# Patient Record
Sex: MALE | Race: WHITE | NOT HISPANIC OR LATINO
[De-identification: names, ages, dates, MRNs, and addresses within clinical notes are randomized per-mention and may not be internally consistent; named-entity substitution may affect disease eponyms.]

---

## 2017-03-05 ENCOUNTER — FORM ENCOUNTER (OUTPATIENT)
Age: 57
End: 2017-03-05

## 2017-03-06 ENCOUNTER — APPOINTMENT (OUTPATIENT)
Dept: ULTRASOUND IMAGING | Facility: CLINIC | Age: 57
End: 2017-03-06

## 2017-03-06 ENCOUNTER — OUTPATIENT (OUTPATIENT)
Dept: OUTPATIENT SERVICES | Facility: HOSPITAL | Age: 57
LOS: 1 days | End: 2017-03-06
Payer: COMMERCIAL

## 2017-03-06 DIAGNOSIS — Z00.8 ENCOUNTER FOR OTHER GENERAL EXAMINATION: ICD-10-CM

## 2017-03-06 PROCEDURE — 76882 US LMTD JT/FCL EVL NVASC XTR: CPT

## 2017-06-15 ENCOUNTER — APPOINTMENT (OUTPATIENT)
Dept: ORTHOPEDIC SURGERY | Facility: CLINIC | Age: 57
End: 2017-06-15

## 2017-07-12 ENCOUNTER — OUTPATIENT (OUTPATIENT)
Dept: OUTPATIENT SERVICES | Facility: HOSPITAL | Age: 57
LOS: 1 days | End: 2017-07-12
Payer: COMMERCIAL

## 2017-07-12 ENCOUNTER — APPOINTMENT (OUTPATIENT)
Dept: MRI IMAGING | Facility: HOSPITAL | Age: 57
End: 2017-07-12

## 2017-07-12 PROCEDURE — 73721 MRI JNT OF LWR EXTRE W/O DYE: CPT

## 2017-10-11 ENCOUNTER — OUTPATIENT (OUTPATIENT)
Dept: OUTPATIENT SERVICES | Facility: HOSPITAL | Age: 57
LOS: 1 days | End: 2017-10-11
Payer: COMMERCIAL

## 2017-10-11 ENCOUNTER — APPOINTMENT (OUTPATIENT)
Dept: ULTRASOUND IMAGING | Facility: CLINIC | Age: 57
End: 2017-10-11
Payer: COMMERCIAL

## 2017-10-11 DIAGNOSIS — R74.8 ABNORMAL LEVELS OF OTHER SERUM ENZYMES: ICD-10-CM

## 2017-10-11 PROCEDURE — 76700 US EXAM ABDOM COMPLETE: CPT

## 2017-10-11 PROCEDURE — 76700 US EXAM ABDOM COMPLETE: CPT | Mod: 26

## 2019-03-08 ENCOUNTER — APPOINTMENT (OUTPATIENT)
Dept: PULMONOLOGY | Facility: CLINIC | Age: 59
End: 2019-03-08
Payer: COMMERCIAL

## 2019-03-08 VITALS
SYSTOLIC BLOOD PRESSURE: 145 MMHG | HEART RATE: 68 BPM | TEMPERATURE: 97.7 F | HEIGHT: 69 IN | WEIGHT: 253 LBS | OXYGEN SATURATION: 95 % | BODY MASS INDEX: 37.47 KG/M2 | DIASTOLIC BLOOD PRESSURE: 93 MMHG

## 2019-03-08 LAB — POCT - HEMOGLOBIN (HGB), QUANTITATIVE, TRANSCUTANEOUS: 15.8

## 2019-03-08 PROCEDURE — 94727 GAS DIL/WSHOT DETER LNG VOL: CPT

## 2019-03-08 PROCEDURE — 94060 EVALUATION OF WHEEZING: CPT

## 2019-03-08 PROCEDURE — 94729 DIFFUSING CAPACITY: CPT

## 2019-03-08 PROCEDURE — 99244 OFF/OP CNSLTJ NEW/EST MOD 40: CPT | Mod: 25

## 2019-03-08 PROCEDURE — 88738 HGB QUANT TRANSCUTANEOUS: CPT

## 2019-03-08 RX ORDER — LAMOTRIGINE 25 MG/1
25 TABLET ORAL
Qty: 42 | Refills: 0 | Status: DISCONTINUED | COMMUNITY
Start: 2019-02-06

## 2019-03-08 NOTE — CONSULT LETTER
[FreeTextEntry1] : Dear ,\par \par I had the pleasure of evaluating your patient, ELIZABETH BAEZ today in pulmonary consultation.  Please refer to my attached note for my findings and recommendations.\par \par \par Thank you for allowing me to participate in the care of your patient, please feel free to call with any questions or concerns.\par \par \par Sincerely,\par \par Lori Ac MD\par [facility] \par [facility]\par \par

## 2019-03-08 NOTE — PHYSICAL EXAM
[Well Groomed] : well groomed [General Appearance - In No Acute Distress] : no acute distress [Heart Sounds] : normal S1 and S2 [] : no respiratory distress [Auscultation Breath Sounds / Voice Sounds] : lungs were clear to auscultation bilaterally [Nail Clubbing] : no clubbing of the fingernails [Cyanosis, Localized] : no localized cyanosis [Normal Oropharynx] : normal oropharynx [I] : I

## 2019-03-08 NOTE — HISTORY OF PRESENT ILLNESS
[FreeTextEntry1] : 58M with GIRISH diagnosed 7 years ago, was  severe, ahi 46, was given CPAP, stopped using for years but recently started to use it again at family's urging bc of loud snoring and daytime irritability.\par \par snoring, +witnessed apneas, daytime sleepiness and irritability.  Grinds teeth, uses OTC mouth guard.  ESS 10\par \par Denies issues with sob/cp/cough.  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

## 2019-04-04 ENCOUNTER — APPOINTMENT (OUTPATIENT)
Age: 59
End: 2019-04-04
Payer: COMMERCIAL

## 2019-04-04 PROCEDURE — 95800 SLP STDY UNATTENDED: CPT | Mod: 52

## 2019-04-05 PROCEDURE — 95800 SLP STDY UNATTENDED: CPT

## 2019-04-07 PROCEDURE — 95800 SLP STDY UNATTENDED: CPT | Mod: 52

## 2019-04-08 ENCOUNTER — FORM ENCOUNTER (OUTPATIENT)
Age: 59
End: 2019-04-08

## 2019-04-22 ENCOUNTER — APPOINTMENT (OUTPATIENT)
Dept: PULMONOLOGY | Facility: CLINIC | Age: 59
End: 2019-04-22

## 2019-04-23 ENCOUNTER — APPOINTMENT (OUTPATIENT)
Dept: PULMONOLOGY | Facility: CLINIC | Age: 59
End: 2019-04-23

## 2019-04-25 ENCOUNTER — APPOINTMENT (OUTPATIENT)
Dept: PULMONOLOGY | Facility: CLINIC | Age: 59
End: 2019-04-25
Payer: COMMERCIAL

## 2019-04-25 VITALS — OXYGEN SATURATION: 97 % | DIASTOLIC BLOOD PRESSURE: 97 MMHG | SYSTOLIC BLOOD PRESSURE: 154 MMHG | HEART RATE: 66 BPM

## 2019-04-25 PROCEDURE — 99213 OFFICE O/P EST LOW 20 MIN: CPT

## 2019-04-25 NOTE — HISTORY OF PRESENT ILLNESS
[FreeTextEntry1] : had sleep study\par ahi 48, severe\par O2 desats to 70's\par needs in lab titration study

## 2019-07-04 ENCOUNTER — FORM ENCOUNTER (OUTPATIENT)
Age: 59
End: 2019-07-04

## 2019-07-09 ENCOUNTER — APPOINTMENT (OUTPATIENT)
Dept: PULMONOLOGY | Facility: CLINIC | Age: 59
End: 2019-07-09
Payer: COMMERCIAL

## 2019-07-09 VITALS
SYSTOLIC BLOOD PRESSURE: 144 MMHG | OXYGEN SATURATION: 96 % | BODY MASS INDEX: 36.29 KG/M2 | WEIGHT: 245 LBS | HEIGHT: 69 IN | RESPIRATION RATE: 16 BRPM | DIASTOLIC BLOOD PRESSURE: 92 MMHG | TEMPERATURE: 98 F | HEART RATE: 76 BPM

## 2019-07-09 DIAGNOSIS — Z99.89 OBSTRUCTIVE SLEEP APNEA (ADULT) (PEDIATRIC): ICD-10-CM

## 2019-07-09 DIAGNOSIS — G47.33 OBSTRUCTIVE SLEEP APNEA (ADULT) (PEDIATRIC): ICD-10-CM

## 2019-07-09 PROCEDURE — 99212 OFFICE O/P EST SF 10 MIN: CPT

## 2019-07-09 NOTE — PHYSICAL EXAM
[Well Groomed] : well groomed [General Appearance - In No Acute Distress] : no acute distress [] : no respiratory distress [Respiration, Rhythm And Depth] : normal respiratory rhythm and effort [Exaggerated Use Of Accessory Muscles For Inspiration] : no accessory muscle use [Auscultation Breath Sounds / Voice Sounds] : lungs were clear to auscultation bilaterally

## 2019-07-09 NOTE — HISTORY OF PRESENT ILLNESS
[FreeTextEntry1] : had cpap titration study\par needs cpap 14, full face mask size medium\par \par still using old machine and is doing ok

## 2019-07-09 NOTE — ASSESSMENT
[FreeTextEntry1] : will initiate cpap 14 via full face mask now\par pt to follow up after 1 month of use.

## 2023-03-27 ENCOUNTER — APPOINTMENT (OUTPATIENT)
Dept: RHEUMATOLOGY | Facility: CLINIC | Age: 63
End: 2023-03-27

## 2023-04-13 ENCOUNTER — APPOINTMENT (OUTPATIENT)
Dept: RHEUMATOLOGY | Facility: CLINIC | Age: 63
End: 2023-04-13

## 2023-10-26 ENCOUNTER — APPOINTMENT (OUTPATIENT)
Dept: ORTHOPEDIC SURGERY | Facility: CLINIC | Age: 63
End: 2023-10-26
Payer: COMMERCIAL

## 2023-10-26 ENCOUNTER — NON-APPOINTMENT (OUTPATIENT)
Age: 63
End: 2023-10-26

## 2023-10-26 VITALS
HEIGHT: 69 IN | DIASTOLIC BLOOD PRESSURE: 74 MMHG | WEIGHT: 240 LBS | SYSTOLIC BLOOD PRESSURE: 128 MMHG | BODY MASS INDEX: 35.55 KG/M2 | HEART RATE: 65 BPM

## 2023-10-26 DIAGNOSIS — M17.12 UNILATERAL PRIMARY OSTEOARTHRITIS, LEFT KNEE: ICD-10-CM

## 2023-10-26 PROCEDURE — 99205 OFFICE O/P NEW HI 60 MIN: CPT

## 2023-10-26 PROCEDURE — 73562 X-RAY EXAM OF KNEE 3: CPT | Mod: LT

## 2023-12-01 ENCOUNTER — OUTPATIENT (OUTPATIENT)
Dept: OUTPATIENT SERVICES | Facility: HOSPITAL | Age: 63
LOS: 1 days | End: 2023-12-01
Payer: COMMERCIAL

## 2023-12-01 VITALS
WEIGHT: 244.93 LBS | HEIGHT: 69 IN | TEMPERATURE: 98 F | RESPIRATION RATE: 16 BRPM | SYSTOLIC BLOOD PRESSURE: 156 MMHG | OXYGEN SATURATION: 98 % | DIASTOLIC BLOOD PRESSURE: 93 MMHG | HEART RATE: 58 BPM

## 2023-12-01 DIAGNOSIS — Z98.890 OTHER SPECIFIED POSTPROCEDURAL STATES: Chronic | ICD-10-CM

## 2023-12-01 DIAGNOSIS — Z01.818 ENCOUNTER FOR OTHER PREPROCEDURAL EXAMINATION: ICD-10-CM

## 2023-12-01 DIAGNOSIS — M17.12 UNILATERAL PRIMARY OSTEOARTHRITIS, LEFT KNEE: ICD-10-CM

## 2023-12-01 LAB
A1C WITH ESTIMATED AVERAGE GLUCOSE RESULT: 5.9 % — HIGH (ref 4–5.6)
A1C WITH ESTIMATED AVERAGE GLUCOSE RESULT: 5.9 % — HIGH (ref 4–5.6)
ALBUMIN SERPL ELPH-MCNC: 4.2 G/DL — SIGNIFICANT CHANGE UP (ref 3.3–5)
ALBUMIN SERPL ELPH-MCNC: 4.2 G/DL — SIGNIFICANT CHANGE UP (ref 3.3–5)
ALP SERPL-CCNC: 80 U/L — SIGNIFICANT CHANGE UP (ref 30–120)
ALP SERPL-CCNC: 80 U/L — SIGNIFICANT CHANGE UP (ref 30–120)
ALT FLD-CCNC: 52 U/L — SIGNIFICANT CHANGE UP (ref 10–60)
ALT FLD-CCNC: 52 U/L — SIGNIFICANT CHANGE UP (ref 10–60)
ANION GAP SERPL CALC-SCNC: 8 MMOL/L — SIGNIFICANT CHANGE UP (ref 5–17)
ANION GAP SERPL CALC-SCNC: 8 MMOL/L — SIGNIFICANT CHANGE UP (ref 5–17)
APTT BLD: 32.9 SEC — SIGNIFICANT CHANGE UP (ref 24.5–35.6)
APTT BLD: 32.9 SEC — SIGNIFICANT CHANGE UP (ref 24.5–35.6)
AST SERPL-CCNC: 28 U/L — SIGNIFICANT CHANGE UP (ref 10–40)
AST SERPL-CCNC: 28 U/L — SIGNIFICANT CHANGE UP (ref 10–40)
BILIRUB SERPL-MCNC: 0.5 MG/DL — SIGNIFICANT CHANGE UP (ref 0.2–1.2)
BILIRUB SERPL-MCNC: 0.5 MG/DL — SIGNIFICANT CHANGE UP (ref 0.2–1.2)
BLD GP AB SCN SERPL QL: SIGNIFICANT CHANGE UP
BLD GP AB SCN SERPL QL: SIGNIFICANT CHANGE UP
BUN SERPL-MCNC: 20 MG/DL — SIGNIFICANT CHANGE UP (ref 7–23)
BUN SERPL-MCNC: 20 MG/DL — SIGNIFICANT CHANGE UP (ref 7–23)
CALCIUM SERPL-MCNC: 9.2 MG/DL — SIGNIFICANT CHANGE UP (ref 8.4–10.5)
CALCIUM SERPL-MCNC: 9.2 MG/DL — SIGNIFICANT CHANGE UP (ref 8.4–10.5)
CHLORIDE SERPL-SCNC: 104 MMOL/L — SIGNIFICANT CHANGE UP (ref 96–108)
CHLORIDE SERPL-SCNC: 104 MMOL/L — SIGNIFICANT CHANGE UP (ref 96–108)
CO2 SERPL-SCNC: 28 MMOL/L — SIGNIFICANT CHANGE UP (ref 22–31)
CO2 SERPL-SCNC: 28 MMOL/L — SIGNIFICANT CHANGE UP (ref 22–31)
CREAT SERPL-MCNC: 1.02 MG/DL — SIGNIFICANT CHANGE UP (ref 0.5–1.3)
CREAT SERPL-MCNC: 1.02 MG/DL — SIGNIFICANT CHANGE UP (ref 0.5–1.3)
EGFR: 83 ML/MIN/1.73M2 — SIGNIFICANT CHANGE UP
EGFR: 83 ML/MIN/1.73M2 — SIGNIFICANT CHANGE UP
ESTIMATED AVERAGE GLUCOSE: 123 MG/DL — HIGH (ref 68–114)
ESTIMATED AVERAGE GLUCOSE: 123 MG/DL — HIGH (ref 68–114)
GLUCOSE SERPL-MCNC: 137 MG/DL — HIGH (ref 70–99)
GLUCOSE SERPL-MCNC: 137 MG/DL — HIGH (ref 70–99)
HCT VFR BLD CALC: 48.2 % — SIGNIFICANT CHANGE UP (ref 39–50)
HCT VFR BLD CALC: 48.2 % — SIGNIFICANT CHANGE UP (ref 39–50)
HGB BLD-MCNC: 15.9 G/DL — SIGNIFICANT CHANGE UP (ref 13–17)
HGB BLD-MCNC: 15.9 G/DL — SIGNIFICANT CHANGE UP (ref 13–17)
INR BLD: 1.01 RATIO — SIGNIFICANT CHANGE UP (ref 0.85–1.18)
INR BLD: 1.01 RATIO — SIGNIFICANT CHANGE UP (ref 0.85–1.18)
MCHC RBC-ENTMCNC: 28.3 PG — SIGNIFICANT CHANGE UP (ref 27–34)
MCHC RBC-ENTMCNC: 28.3 PG — SIGNIFICANT CHANGE UP (ref 27–34)
MCHC RBC-ENTMCNC: 33 GM/DL — SIGNIFICANT CHANGE UP (ref 32–36)
MCHC RBC-ENTMCNC: 33 GM/DL — SIGNIFICANT CHANGE UP (ref 32–36)
MCV RBC AUTO: 85.8 FL — SIGNIFICANT CHANGE UP (ref 80–100)
MCV RBC AUTO: 85.8 FL — SIGNIFICANT CHANGE UP (ref 80–100)
MRSA PCR RESULT.: SIGNIFICANT CHANGE UP
MRSA PCR RESULT.: SIGNIFICANT CHANGE UP
NRBC # BLD: 0 /100 WBCS — SIGNIFICANT CHANGE UP (ref 0–0)
NRBC # BLD: 0 /100 WBCS — SIGNIFICANT CHANGE UP (ref 0–0)
PLATELET # BLD AUTO: 299 K/UL — SIGNIFICANT CHANGE UP (ref 150–400)
PLATELET # BLD AUTO: 299 K/UL — SIGNIFICANT CHANGE UP (ref 150–400)
POTASSIUM SERPL-MCNC: 4.5 MMOL/L — SIGNIFICANT CHANGE UP (ref 3.5–5.3)
POTASSIUM SERPL-MCNC: 4.5 MMOL/L — SIGNIFICANT CHANGE UP (ref 3.5–5.3)
POTASSIUM SERPL-SCNC: 4.5 MMOL/L — SIGNIFICANT CHANGE UP (ref 3.5–5.3)
POTASSIUM SERPL-SCNC: 4.5 MMOL/L — SIGNIFICANT CHANGE UP (ref 3.5–5.3)
PROT SERPL-MCNC: 7.6 G/DL — SIGNIFICANT CHANGE UP (ref 6–8.3)
PROT SERPL-MCNC: 7.6 G/DL — SIGNIFICANT CHANGE UP (ref 6–8.3)
PROTHROM AB SERPL-ACNC: 11 SEC — SIGNIFICANT CHANGE UP (ref 9.5–13)
PROTHROM AB SERPL-ACNC: 11 SEC — SIGNIFICANT CHANGE UP (ref 9.5–13)
RBC # BLD: 5.62 M/UL — SIGNIFICANT CHANGE UP (ref 4.2–5.8)
RBC # BLD: 5.62 M/UL — SIGNIFICANT CHANGE UP (ref 4.2–5.8)
RBC # FLD: 12.4 % — SIGNIFICANT CHANGE UP (ref 10.3–14.5)
RBC # FLD: 12.4 % — SIGNIFICANT CHANGE UP (ref 10.3–14.5)
S AUREUS DNA NOSE QL NAA+PROBE: DETECTED
S AUREUS DNA NOSE QL NAA+PROBE: DETECTED
SODIUM SERPL-SCNC: 140 MMOL/L — SIGNIFICANT CHANGE UP (ref 135–145)
SODIUM SERPL-SCNC: 140 MMOL/L — SIGNIFICANT CHANGE UP (ref 135–145)
WBC # BLD: 6.3 K/UL — SIGNIFICANT CHANGE UP (ref 3.8–10.5)
WBC # BLD: 6.3 K/UL — SIGNIFICANT CHANGE UP (ref 3.8–10.5)
WBC # FLD AUTO: 6.3 K/UL — SIGNIFICANT CHANGE UP (ref 3.8–10.5)
WBC # FLD AUTO: 6.3 K/UL — SIGNIFICANT CHANGE UP (ref 3.8–10.5)

## 2023-12-01 PROCEDURE — 93010 ELECTROCARDIOGRAM REPORT: CPT

## 2023-12-01 PROCEDURE — G0463: CPT

## 2023-12-01 PROCEDURE — 93005 ELECTROCARDIOGRAM TRACING: CPT

## 2023-12-01 NOTE — H&P PST ADULT - PRIMARY CARE PROVIDER
[facility]burn[facility]003 [phone] [facility]burn[facility]184 [phone] [phone]

## 2023-12-01 NOTE — H&P PST ADULT - HISTORY OF PRESENT ILLNESS
62 y/o male with PMH of HTN, GOUT, ADHD, GIRISH-non compliant with CPAP , OA to knees with left knee pain. Pain/discomfort for many years, last couple of months-2, started bothering him badly affecting walking, playing tennis and was advised surgical intervention. 64 y/o male with PMH of HTN, GOUT, ADHD, GIRISH-non compliant with CPAP , OA to knees with left knee pain. Pain/discomfort for many years, last couple of months-2, started bothering him badly affecting walking, playing tennis and was advised surgical intervention.

## 2023-12-01 NOTE — H&P PST ADULT - RESPIRATORY
clear to auscultation bilaterally/no wheezes/no rales/breath sounds equal/good air movement/respirations non-labored/no intercostal retractions

## 2023-12-01 NOTE — H&P PST ADULT - ASSESSMENT
62 y/o male with left knee pain  Planned surgery.- left total knee replacement  Will obtain medical clearance  Pre op instructions provided  Instructions provided on medications to continue and to take the day morning of surgery   64 y/o male with left knee pain  Planned surgery.- left total knee replacement  Will obtain medical clearance  Pre op instructions provided  Instructions provided on medications to continue and to take the day morning of surgery

## 2023-12-01 NOTE — H&P PST ADULT - NSICDXPASTMEDICALHX_GEN_ALL_CORE_FT
PAST MEDICAL HISTORY:  Adult ADHD     History of Lyme disease     Hypertension     OA (osteoarthritis) of knee     GIRISH on CPAP     Personal history of gout      PAST MEDICAL HISTORY:  Adult ADHD     History of Lyme disease     History of right bundle branch block (RBBB)     Hypertension     OA (osteoarthritis) of knee     GIRISH on CPAP     Personal history of gout

## 2023-12-04 RX ORDER — MUPIROCIN 20 MG/G
1 OINTMENT TOPICAL
Qty: 1 | Refills: 0
Start: 2023-12-04 | End: 2023-12-08

## 2023-12-11 PROBLEM — I10 ESSENTIAL (PRIMARY) HYPERTENSION: Chronic | Status: ACTIVE | Noted: 2023-12-01

## 2023-12-11 PROBLEM — Z87.39 PERSONAL HISTORY OF OTHER DISEASES OF THE MUSCULOSKELETAL SYSTEM AND CONNECTIVE TISSUE: Chronic | Status: ACTIVE | Noted: 2023-12-01

## 2023-12-11 PROBLEM — G47.33 OBSTRUCTIVE SLEEP APNEA (ADULT) (PEDIATRIC): Chronic | Status: ACTIVE | Noted: 2023-12-01

## 2023-12-12 PROBLEM — F90.9 ATTENTION-DEFICIT HYPERACTIVITY DISORDER, UNSPECIFIED TYPE: Chronic | Status: ACTIVE | Noted: 2023-12-01

## 2023-12-12 PROBLEM — Z86.19 PERSONAL HISTORY OF OTHER INFECTIOUS AND PARASITIC DISEASES: Chronic | Status: ACTIVE | Noted: 2023-12-01

## 2023-12-12 PROBLEM — M17.9 OSTEOARTHRITIS OF KNEE, UNSPECIFIED: Chronic | Status: ACTIVE | Noted: 2023-12-01

## 2023-12-12 PROBLEM — Z86.79 PERSONAL HISTORY OF OTHER DISEASES OF THE CIRCULATORY SYSTEM: Chronic | Status: ACTIVE | Noted: 2023-12-01

## 2023-12-12 RX ORDER — TRANEXAMIC ACID 100 MG/ML
1000 INJECTION, SOLUTION INTRAVENOUS ONCE
Refills: 0 | Status: DISCONTINUED | OUTPATIENT
Start: 2023-12-18 | End: 2024-01-01

## 2023-12-12 RX ORDER — ACETAMINOPHEN 500 MG
1000 TABLET ORAL ONCE
Refills: 0 | Status: DISCONTINUED | OUTPATIENT
Start: 2023-12-18 | End: 2024-01-01

## 2023-12-15 RX ORDER — LAMOTRIGINE 25 MG/1
1 TABLET, ORALLY DISINTEGRATING ORAL
Refills: 0 | DISCHARGE

## 2023-12-18 ENCOUNTER — RESULT REVIEW (OUTPATIENT)
Age: 63
End: 2023-12-18

## 2023-12-18 ENCOUNTER — APPOINTMENT (OUTPATIENT)
Dept: ORTHOPEDIC SURGERY | Facility: HOSPITAL | Age: 63
End: 2023-12-18

## 2023-12-18 ENCOUNTER — OUTPATIENT (OUTPATIENT)
Dept: OUTPATIENT SERVICES | Facility: HOSPITAL | Age: 63
LOS: 1 days | End: 2023-12-18
Payer: COMMERCIAL

## 2023-12-18 ENCOUNTER — TRANSCRIPTION ENCOUNTER (OUTPATIENT)
Age: 63
End: 2023-12-18

## 2023-12-18 VITALS
DIASTOLIC BLOOD PRESSURE: 93 MMHG | RESPIRATION RATE: 20 BRPM | TEMPERATURE: 97 F | HEIGHT: 69 IN | OXYGEN SATURATION: 99 % | WEIGHT: 247.14 LBS | HEART RATE: 60 BPM | SYSTOLIC BLOOD PRESSURE: 154 MMHG

## 2023-12-18 DIAGNOSIS — Z98.890 OTHER SPECIFIED POSTPROCEDURAL STATES: Chronic | ICD-10-CM

## 2023-12-18 DIAGNOSIS — M17.12 UNILATERAL PRIMARY OSTEOARTHRITIS, LEFT KNEE: ICD-10-CM

## 2023-12-18 PROCEDURE — 27447 TOTAL KNEE ARTHROPLASTY: CPT | Mod: LT

## 2023-12-18 PROCEDURE — 73560 X-RAY EXAM OF KNEE 1 OR 2: CPT | Mod: 26,LT

## 2023-12-18 PROCEDURE — 99222 1ST HOSP IP/OBS MODERATE 55: CPT

## 2023-12-18 PROCEDURE — 27447 TOTAL KNEE ARTHROPLASTY: CPT | Mod: AS,LT

## 2023-12-18 DEVICE — COMP PATELLA TRI-PEG E-PLUS POLY 9X38MM: Type: IMPLANTABLE DEVICE | Site: LEFT | Status: FUNCTIONAL

## 2023-12-18 DEVICE — IMPLANTABLE DEVICE: Type: IMPLANTABLE DEVICE | Site: LEFT | Status: FUNCTIONAL

## 2023-12-18 DEVICE — COMP FEM NON POROUS SZ 10 LT: Type: IMPLANTABLE DEVICE | Site: LEFT | Status: FUNCTIONAL

## 2023-12-18 DEVICE — BONE WAX 2.5GM: Type: IMPLANTABLE DEVICE | Site: LEFT | Status: FUNCTIONAL

## 2023-12-18 DEVICE — INSERT TIB NONPOROUS UNIV SZ 11 LT: Type: IMPLANTABLE DEVICE | Site: LEFT | Status: FUNCTIONAL

## 2023-12-18 RX ORDER — SODIUM CHLORIDE 9 MG/ML
500 INJECTION INTRAMUSCULAR; INTRAVENOUS; SUBCUTANEOUS ONCE
Refills: 0 | Status: COMPLETED | OUTPATIENT
Start: 2023-12-18 | End: 2023-12-18

## 2023-12-18 RX ORDER — CEFAZOLIN SODIUM 1 G
2000 VIAL (EA) INJECTION ONCE
Refills: 0 | Status: COMPLETED | OUTPATIENT
Start: 2023-12-18 | End: 2023-12-18

## 2023-12-18 RX ORDER — OXYCODONE HYDROCHLORIDE 5 MG/1
10 TABLET ORAL
Refills: 0 | Status: DISCONTINUED | OUTPATIENT
Start: 2023-12-18 | End: 2023-12-18

## 2023-12-18 RX ORDER — HYDROMORPHONE HYDROCHLORIDE 2 MG/ML
1 INJECTION INTRAMUSCULAR; INTRAVENOUS; SUBCUTANEOUS
Refills: 0 | Status: DISCONTINUED | OUTPATIENT
Start: 2023-12-18 | End: 2023-12-18

## 2023-12-18 RX ORDER — OXYCODONE HYDROCHLORIDE 5 MG/1
5 TABLET ORAL
Refills: 0 | Status: DISCONTINUED | OUTPATIENT
Start: 2023-12-18 | End: 2023-12-18

## 2023-12-18 RX ORDER — SENNA PLUS 8.6 MG/1
2 TABLET ORAL AT BEDTIME
Refills: 0 | Status: DISCONTINUED | OUTPATIENT
Start: 2023-12-18 | End: 2024-01-01

## 2023-12-18 RX ORDER — CELECOXIB 200 MG/1
200 CAPSULE ORAL EVERY 12 HOURS
Refills: 0 | Status: DISCONTINUED | OUTPATIENT
Start: 2023-12-18 | End: 2024-01-01

## 2023-12-18 RX ORDER — ACETAMINOPHEN 500 MG
1000 TABLET ORAL EVERY 8 HOURS
Refills: 0 | Status: DISCONTINUED | OUTPATIENT
Start: 2023-12-18 | End: 2024-01-01

## 2023-12-18 RX ORDER — CEFAZOLIN SODIUM 1 G
2000 VIAL (EA) INJECTION EVERY 8 HOURS
Refills: 0 | Status: COMPLETED | OUTPATIENT
Start: 2023-12-18 | End: 2023-12-19

## 2023-12-18 RX ORDER — SODIUM CHLORIDE 9 MG/ML
1000 INJECTION, SOLUTION INTRAVENOUS
Refills: 0 | Status: DISCONTINUED | OUTPATIENT
Start: 2023-12-18 | End: 2024-01-01

## 2023-12-18 RX ORDER — HYDROMORPHONE HYDROCHLORIDE 2 MG/ML
0.5 INJECTION INTRAMUSCULAR; INTRAVENOUS; SUBCUTANEOUS
Refills: 0 | Status: DISCONTINUED | OUTPATIENT
Start: 2023-12-18 | End: 2023-12-18

## 2023-12-18 RX ORDER — ACETAMINOPHEN 500 MG
1000 TABLET ORAL ONCE
Refills: 0 | Status: COMPLETED | OUTPATIENT
Start: 2023-12-18 | End: 2023-12-18

## 2023-12-18 RX ORDER — MAGNESIUM HYDROXIDE 400 MG/1
30 TABLET, CHEWABLE ORAL DAILY
Refills: 0 | Status: DISCONTINUED | OUTPATIENT
Start: 2023-12-18 | End: 2024-01-01

## 2023-12-18 RX ORDER — ALLOPURINOL 300 MG
200 TABLET ORAL DAILY
Refills: 0 | Status: DISCONTINUED | OUTPATIENT
Start: 2023-12-18 | End: 2024-01-01

## 2023-12-18 RX ORDER — PANTOPRAZOLE SODIUM 20 MG/1
40 TABLET, DELAYED RELEASE ORAL
Refills: 0 | Status: DISCONTINUED | OUTPATIENT
Start: 2023-12-18 | End: 2024-01-01

## 2023-12-18 RX ORDER — ASPIRIN/CALCIUM CARB/MAGNESIUM 324 MG
81 TABLET ORAL EVERY 12 HOURS
Refills: 0 | Status: DISCONTINUED | OUTPATIENT
Start: 2023-12-19 | End: 2024-01-01

## 2023-12-18 RX ORDER — POLYETHYLENE GLYCOL 3350 17 G/17G
17 POWDER, FOR SOLUTION ORAL AT BEDTIME
Refills: 0 | Status: DISCONTINUED | OUTPATIENT
Start: 2023-12-18 | End: 2024-01-01

## 2023-12-18 RX ORDER — ONDANSETRON 8 MG/1
4 TABLET, FILM COATED ORAL EVERY 6 HOURS
Refills: 0 | Status: DISCONTINUED | OUTPATIENT
Start: 2023-12-18 | End: 2024-01-01

## 2023-12-18 RX ORDER — DEXAMETHASONE 0.5 MG/5ML
8 ELIXIR ORAL ONCE
Refills: 0 | Status: COMPLETED | OUTPATIENT
Start: 2023-12-19 | End: 2023-12-19

## 2023-12-18 RX ORDER — ONDANSETRON 8 MG/1
4 TABLET, FILM COATED ORAL ONCE
Refills: 0 | Status: DISCONTINUED | OUTPATIENT
Start: 2023-12-18 | End: 2024-01-01

## 2023-12-18 RX ORDER — AMLODIPINE BESYLATE 2.5 MG/1
5 TABLET ORAL DAILY
Refills: 0 | Status: DISCONTINUED | OUTPATIENT
Start: 2023-12-20 | End: 2024-01-01

## 2023-12-18 RX ORDER — LAMOTRIGINE 25 MG/1
200 TABLET, ORALLY DISINTEGRATING ORAL DAILY
Refills: 0 | Status: DISCONTINUED | OUTPATIENT
Start: 2023-12-18 | End: 2024-01-01

## 2023-12-18 RX ORDER — CHLORHEXIDINE GLUCONATE 213 G/1000ML
1 SOLUTION TOPICAL ONCE
Refills: 0 | Status: COMPLETED | OUTPATIENT
Start: 2023-12-18 | End: 2023-12-18

## 2023-12-18 RX ORDER — APREPITANT 80 MG/1
40 CAPSULE ORAL ONCE
Refills: 0 | Status: COMPLETED | OUTPATIENT
Start: 2023-12-18 | End: 2023-12-18

## 2023-12-18 RX ADMIN — Medication 1000 MILLIGRAM(S): at 21:22

## 2023-12-18 RX ADMIN — SODIUM CHLORIDE 75 MILLILITER(S): 9 INJECTION, SOLUTION INTRAVENOUS at 11:51

## 2023-12-18 RX ADMIN — SODIUM CHLORIDE 500 MILLILITER(S): 9 INJECTION INTRAMUSCULAR; INTRAVENOUS; SUBCUTANEOUS at 19:37

## 2023-12-18 RX ADMIN — Medication 1000 MILLIGRAM(S): at 22:22

## 2023-12-18 RX ADMIN — Medication 100 MILLIGRAM(S): at 16:55

## 2023-12-18 RX ADMIN — CHLORHEXIDINE GLUCONATE 1 APPLICATION(S): 213 SOLUTION TOPICAL at 07:30

## 2023-12-18 RX ADMIN — SODIUM CHLORIDE 100 MILLILITER(S): 9 INJECTION, SOLUTION INTRAVENOUS at 21:23

## 2023-12-18 RX ADMIN — CELECOXIB 200 MILLIGRAM(S): 200 CAPSULE ORAL at 22:22

## 2023-12-18 RX ADMIN — Medication 1000 MILLIGRAM(S): at 17:09

## 2023-12-18 RX ADMIN — SODIUM CHLORIDE 100 MILLILITER(S): 9 INJECTION, SOLUTION INTRAVENOUS at 17:09

## 2023-12-18 RX ADMIN — CELECOXIB 200 MILLIGRAM(S): 200 CAPSULE ORAL at 21:22

## 2023-12-18 RX ADMIN — Medication 400 MILLIGRAM(S): at 15:38

## 2023-12-18 RX ADMIN — APREPITANT 40 MILLIGRAM(S): 80 CAPSULE ORAL at 07:30

## 2023-12-18 RX ADMIN — SODIUM CHLORIDE 500 MILLILITER(S): 9 INJECTION INTRAMUSCULAR; INTRAVENOUS; SUBCUTANEOUS at 12:27

## 2023-12-18 NOTE — CARE COORDINATION ASSESSMENT. - NSTRANSPORTNEEDS_GEN_ALL_CORE
Pt stated spouse Niurka Ortiz [phone] will  when medically safe for discharge./Auto  Pt stated spouse Niurka Ortiz [phone] will  when medically safe for discharge./Auto

## 2023-12-18 NOTE — PROGRESS NOTE ADULT - SUBJECTIVE AND OBJECTIVE BOX
Ortho PA - Post Op Check - S/P - TKR-      Left      Pt alert and comfortable with no complaints, pain controlled  Denies nausea     Vital Signs Last 24 Hrs  T(C): 37.1 (12-18-23 @ 14:03), Max: 37.1 (12-18-23 @ 14:03)  T(F): 98.7 (12-18-23 @ 14:03), Max: 98.7 (12-18-23 @ 14:03)  HR: 68 (12-18-23 @ 14:03) (60 - 80)  BP: 125/66 (12-18-23 @ 14:03) (108/60 - 125/66)  BP(mean): --  RR: 17 (12-18-23 @ 14:03) (12 - 20)  SpO2: 99% (12-18-23 @ 14:03) (94% - 100%)  I&O's Detail    18 Dec 2023 07:01  -  18 Dec 2023 15:13  --------------------------------------------------------  IN:    Lactated Ringers: 1230 mL    Sodium Chloride 0.9% Bolus: 500 mL  Total IN: 1730 mL    OUT:  Total OUT: 0 mL    Total NET: 1730 mL        I&O's Summary    18 Dec 2023 07:01  -  18 Dec 2023 15:13  --------------------------------------------------------  IN: 1730 mL / OUT: 0 mL / NET: 1730 mL                       PE:  *Knee-primary surgical bandage dry and intact. Feet mobile and sensate.  *EHLs/ant.tibs. 5/5 PAS on LE's.  Calves soft and nontender.    A/P: Ortho stable  - Continue post-op orders; pain management  - Check labs today and in A.M.  - DVT prevention with Aspirin  - PT /OT for OOB, full WBAT  - Medical consult  -Discharge planning  -Will continue to monitor closely with attendings.

## 2023-12-18 NOTE — PATIENT CHOICE NOTE. - NSPTCHOICESTATE_GEN_ALL_CORE
I have met with the patient and/or caregiver to discuss discharge goals and treatment plan. Patient and/or caregiver also provided with instructions on accessing the CMS Compare websites for additional information related to Post Acute Provider quality and resource use measures to assist them in evaluation of the providers and in selecting their post-acute provider of choice. Patient and caregiver were informed of the facilities that are owned and/or operated by [facility]. I have discussed with the patient the availability of in-network facilities and providers. Patient and caregiver provided with a list of post-acute providers whose services are appropriate to the discharge plans and patient needs.     For patient requiring durable medical equipment, patient and/or caregiver were informed that they have the right to request who provides the required equipment. I have met with the patient and/or caregiver to discuss discharge goals and treatment plan. Patient and/or caregiver also provided with instructions on accessing the CMS Compare websites for additional information related to Post Acute Provider quality and resource use measures to assist them in evaluation of the providers and in selecting their post-acute provider of choice. Patient and caregiver were informed of the facilities that are owned and/or operated by [facility]. I have discussed with the patient the availability of in-network facilities and providers. Patient and caregiver provided with a list of post-acute providers whose services are appropriate to the discharge plans and patient needs.     For patient requiring durable medical equipment, patient and/or caregiver were informed that they have the right to request who provides the required equipment.

## 2023-12-18 NOTE — CARE COORDINATION ASSESSMENT. - NSCAREPROVIDERS_GEN_ALL_CORE_FT
CARE PROVIDERS:  Administration: Mora Abbasi  Admitting: Kunal Jasmine  Attending: Kunal Jasmine  Case Management: Bernardo Connell  Case Management: Santaromana, Anna  Covering Team: Ayo Prince  Nurse: Yuliana Herbert  Nurse: Wanda Ochoa  Nurse: Jazmine Kim  Nurse: Brittanie Ivey  Nurse: Keshia Vazquez  Occupational Therapy: Isadroa Rice  Override: Brittanie Ivey  Override: Yuliana Herbert  Physical Therapy: Richardson Briseno  Physical Therapy: Charmaine Rosales  Physical Therapy: Nicolasa Mcnulty  Primary Team: Orlin Matias  Primary Team: Bhaskar Benito  Primary Team: Carri Askew  Primary Team: Cosmo Sol  Primary Team: Maureen Erazo  Registered Dietitian: Swapna Hartley  Team: SELVIN  Hospitalists, Team  UR// Supp. Assoc.: Alycia Samuels   CARE PROVIDERS:  Administration: Mora Abbasi  Admitting: Kunal Jasmine  Attending: Kunal Jasmine  Case Management: Bernardo Connell  Case Management: Santaromana, Anna  Covering Team: Ayo Prince  Nurse: Yuliana Herbert  Nurse: Wanda Ochoa  Nurse: Jazmine Kim  Nurse: Brittanie Ivey  Nurse: Keshia Vazquez  Occupational Therapy: Isadora Rice  Override: Brittanie Ivey  Override: Yuliana Herbert  Physical Therapy: Richardson Briseno  Physical Therapy: Charmaine Rosales  Physical Therapy: Nicolasa Mcnulty  Primary Team: Orlin Matias  Primary Team: Bhaskar Benito  Primary Team: Carri Askew  Primary Team: Cosmo Sol  Primary Team: Maureen Erazo  Registered Dietitian: Swapna Hartley  Team: SELVIN  Hospitalists, Team  UR// Supp. Assoc.: Alycia Samuels

## 2023-12-18 NOTE — DISCHARGE NOTE PROVIDER - NSDCFUSCHEDAPPT_GEN_ALL_CORE_FT
Destiny Husain  [facility]  ORTHOSURG [address]  Scheduled Appointment: 01/04/2024    Kunal Jasmine  [facility]  ORTHOSUR[address]  Scheduled Appointment: 02/13/2024     Destiny Husain  [facility]  ORTHOSURG [address] [facility]  Scheduled Appointment: 01/04/2024    Kunal Jasmine  [facility]  ORTHOSUR[address]  Scheduled Appointment: 02/13/2024

## 2023-12-18 NOTE — DISCHARGE NOTE PROVIDER - CARE PROVIDER_API CALL
Kunal Jasmine  Orthopaedic Surgery  [address]  [place] [address]  Phone: [phone]  Fax: [phone]  Established Patient  Follow Up Time: 2 weeks   Kunal Jasmine  Orthopaedic Surgery  [address] [facility] [address]  [place] [address]  Phone: [phone]  Fax: [phone]  Established Patient  Follow Up Time: 2 weeks   Destiny Jordan  NP in Primary Care Adult-Gerontology  [address] [facility] [facility]  [place] [address]  Phone: [phone]  Fax: [phone]  Established Patient  Scheduled Appointment: 01/04/2024 10:00 AM   Destiny Jordan  NP in Primary Care Adult-Gerontology  [address] [facility] [address] [facility]  [place] [address]  Phone: [phone]  Fax: [phone]  Established Patient  Scheduled Appointment: 01/04/2024 10:00 AM

## 2023-12-18 NOTE — CARE COORDINATION ASSESSMENT. - NSPASTMEDSURGHISTORY_GEN_ALL_CORE_FT
PAST MEDICAL & SURGICAL HISTORY:  History of right bundle branch block (RBBB)      History of Lyme disease      GIRISH on CPAP      OA (osteoarthritis) of knee      Personal history of gout      Adult ADHD      Hypertension      H/O arthroscopy of knee      S/P arthroscopy of right shoulder      S/P arthroscopy of left shoulder

## 2023-12-18 NOTE — DISCHARGE NOTE PROVIDER - PROVIDER TOKENS
PROVIDER:[TOKEN:[[id number]],FOLLOWUP:[2 weeks],ESTABLISHEDPATIENT:[T]] PROVIDER:[TOKEN:[[id number]],SCHEDULEDAPPT:[01/04/2024],SCHEDULEDAPPTTIME:[10:00 AM],ESTABLISHEDPATIENT:[T]] PROVIDER:[TOKEN:[[id number]],SCHEDULEDAPPT:[01/04/2024],SCHEDULEDAPPTTIME:[10:00 AM],ESTABLISHEDPATIENT:[T]]

## 2023-12-18 NOTE — PHYSICAL THERAPY INITIAL EVALUATION ADULT - GAIT TRAINING, PT EVAL
Pt will ambulate 150 feet I with RW with 1-2 days for safe community ambulation. Pt will ascend/descend 12 stairs I with straight cane within 1-2 days for safe household stair negotiation.

## 2023-12-18 NOTE — PROVIDER CONTACT NOTE (OTHER) - NAME OF MD/NP/PA/DO NOTIFIED:
Get adequate rest  Increase fluid intake- may drink fluid like Gatorade if has vomiting or diarrhea. Drink slowly1-2 sips every 10 minutes if has nausea or stomach pains to avoid vomiting.  Avoid spicy, greasy, acidic, or spicy food while you have vomiting and diarrhea and even up to 2 days after getting better, then slowly go back.       aisha angelo

## 2023-12-18 NOTE — CONSULT NOTE ADULT - SUBJECTIVE AND OBJECTIVE BOX
Patient is a 62 yo M with PMH of OA knee who presents for total knee replacement. Patient underwent L total knee arthroplasty on 12/18, procedure was unremarkable as per postop note. Patient reports working with PT today, no loss of sensation in lower extremities.          REVIEW OF SYSTEMS:  CONSTITUTIONAL: No fever, weight loss, or fatigue  EYES: No eye pain, visual disturbances, or discharge  ENMT:  No difficulty hearing, tinnitus, vertigo; No sinus or throat pain  NECK: No pain or stiffness  RESPIRATORY: No cough, wheezing, chills or hemoptysis; No shortness of breath  CARDIOVASCULAR: No chest pain, palpitations, dizziness, or leg swelling  GASTROINTESTINAL: No abdominal or epigastric pain. No nausea, vomiting, or hematemesis; No diarrhea or constipation. No melena or hematochezia.  GENITOURINARY: No dysuria, frequency, hematuria, or incontinence  NEUROLOGICAL: No headaches, memory loss, loss of strength, numbness, or tremors  SKIN: No itching, burning, rashes, or lesions   LYMPH NODES: No enlarged glands  ENDOCRINE: No heat or cold intolerance; No hair loss; No polydipsia or polyuria  MUSCULOSKELETAL: No joint pain or swelling; No muscle, back, or extremity pain  HEME/LYMPH: No easy bruising, or bleeding gums  ALLERGY AND IMMUNOLOGIC: No hives or eczema      GENERAL: patient appears well, no acute distress, appropriate behavior  EYES: sclera clear, no exudates, PERRLA  ENMT: moist mucous membranes, oropharynx clear without erythema, no exudates  LUNGS:  no increased work of breathing, no wheezing appreciated  HEART: no lower extremity edema appreciated  GASTROINTESTINAL: abdomen is soft, nontender, nondistended, no palpable masses appreciated  INTEGUMENT: good skin turgor, warm, dry and intact, no lesions appreciated  MUSCULOSKELETAL: no clubbing or cyanosis, no obvious deformity  NEUROLOGIC: awake, alert, oriented x3, strength no obvious sensory deficits  PSYCHIATRIC: mood is good, affect is congruent, linear and logical thought process  HEME/LYMPH:  no obvious ecchymosis or petechiae     Patient is a 64 yo M with PMH of ADHD, Lyme disease, GIRISH, RBBB, HTN, gout, OA knee who presents for total knee replacement. Patient underwent L total knee arthroplasty on 12/18, procedure was unremarkable as per postop note. Patient reports working with PT today, no loss of sensation in lower extremities.      REVIEW OF SYSTEMS:  CONSTITUTIONAL: No fever, weight loss, or fatigue  EYES: No eye pain, visual disturbances, or discharge  ENMT:  No difficulty hearing, tinnitus, vertigo; No sinus or throat pain  NECK: No pain or stiffness  RESPIRATORY: No cough, wheezing, chills or hemoptysis; No shortness of breath  CARDIOVASCULAR: No chest pain, palpitations, dizziness, or leg swelling  GASTROINTESTINAL: No abdominal or epigastric pain. No nausea, vomiting, or hematemesis; No diarrhea or constipation. No melena or hematochezia.  GENITOURINARY: No dysuria, frequency, hematuria, or incontinence  NEUROLOGICAL: No headaches, memory loss, loss of strength, numbness, or tremors  SKIN: No itching, burning, rashes, or lesions   LYMPH NODES: No enlarged glands  ENDOCRINE: No heat or cold intolerance; No hair loss; No polydipsia or polyuria  MUSCULOSKELETAL: No joint pain or swelling; No muscle, back, or extremity pain  HEME/LYMPH: No easy bruising, or bleeding gums  ALLERGY AND IMMUNOLOGIC: No hives or eczema      GENERAL: patient appears well, no acute distress, appropriate behavior  EYES: sclera clear, no exudates, PERRLA  ENMT: moist mucous membranes, oropharynx clear without erythema, no exudates  LUNGS:  no increased work of breathing, no wheezing appreciated  HEART: no lower extremity edema appreciated  GASTROINTESTINAL: abdomen is soft, nontender, nondistended, no palpable masses appreciated  INTEGUMENT: good skin turgor, warm, dry and intact, no lesions appreciated  MUSCULOSKELETAL: no clubbing or cyanosis, no obvious deformity  NEUROLOGIC: awake, alert, oriented x3, strength no obvious sensory deficits  PSYCHIATRIC: mood is good, affect is congruent, linear and logical thought process  HEME/LYMPH:  no obvious ecchymosis or petechiae

## 2023-12-18 NOTE — PHYSICAL THERAPY INITIAL EVALUATION ADULT - GAIT DEVIATIONS NOTED, PT EVAL
decreased erin/increased time in double stance/decreased step length/decreased stride length/decreased weight-shifting ability

## 2023-12-18 NOTE — ASU PATIENT PROFILE, ADULT - NSICDXPASTMEDICALHX_GEN_ALL_CORE_FT
PAST MEDICAL HISTORY:  Adult ADHD     History of Lyme disease     History of right bundle branch block (RBBB)     Hypertension     OA (osteoarthritis) of knee     GIRISH on CPAP     Personal history of gout

## 2023-12-18 NOTE — DISCHARGE NOTE PROVIDER - NSDCCPCAREPLAN_GEN_ALL_CORE_FT
PRINCIPAL DISCHARGE DIAGNOSIS  Diagnosis: Primary osteoarthritis of left knee  Assessment and Plan of Treatment: Physical Therapy/Occupational Therapy for: ambulation, transfers, stairs, ADL's (activities of daily living), and range of motion exercises  -Activity  • Weight Bearing as tolerated with rolling walker.  • Take short, frequent walks increasing the distance that you walk each day as tolerated.  • Change your position every hour to decrease pain and stiffness.  • Continue the exercises taught to you by your physical therapist.  • No driving until cleared by the doctor.  • No tub baths, hot tubs, or swimming pools until instructed by your doctor.  • Do not squat down on the floor.  • Do not kneel or twist your knee.  • Range of Motion Goals: Flexion= 120 degrees, Extension = 0 degrees  Keep incision area clean and dry.  You may shower post operative day 2if no drainage present.  prineo removal in 2 weeks in Surgeon's office  You may shower. Dressing is water resistant, not waterproof. Do not aim shower stream at surgical site.  Pat dry after showering.  Mepilex dressing will be removed 2 weeks post-op at surgeon's office.

## 2023-12-18 NOTE — OCCUPATIONAL THERAPY INITIAL EVALUATION ADULT - LIVES WITH, PROFILE
Pt lives with his wife in a private home, 1 step to enter, 2 steps throughout the first floor with a full flight to the 2nd floor bedroom with a walk in shower. Pt was independent with ADLs, IADLs, functional mobility/transfers prior to admission without AD./spouse

## 2023-12-18 NOTE — DISCHARGE NOTE PROVIDER - DISCHARGE SERVICE FOR PATIENT
[facility]
on the discharge service for the patient. I have reviewed and made amendments to the documentation where necessary.

## 2023-12-18 NOTE — PHYSICAL THERAPY INITIAL EVALUATION ADULT - PERTINENT HX OF CURRENT PROBLEM, REHAB EVAL
Pt is a 64 y/o male with L knee primary OA. Pt is s/p L total knee replacement 12/18/23. Pt is a 62 y/o male with L knee primary OA. Pt is s/p L total knee replacement 12/18/23.

## 2023-12-18 NOTE — DISCHARGE NOTE PROVIDER - CARE PROVIDERS DIRECT ADDRESSES
,[email or web address] ,[email or web address]

## 2023-12-18 NOTE — CARE COORDINATION ASSESSMENT. - PRO ARRIVE FROM
Per pt  lives with spouse and independent w ADL, ambulation, driving and med management prior to admission. Pt drives to appointments, has 1 stairs to enter home, 13 steps inside pt chose [facility] [phone] ,Pt stated he has walker cane commode. CM verified: PCP: Dr. Tracey  Pharmacy:  [facility] [address] [place]: stated no. Pt stated spouse Niurka Ortiz [phone] will  when medically safe for discharge./home Per pt  lives with spouse and independent w ADL, ambulation, driving and med management prior to admission. Pt drives to appointments, has 1 stairs to enter home, 13 steps inside pt chose [facility] [phone] ,Pt stated he has walker cane commode. CM verified: PCP: Dr. Tracey  Pharmacy:  [facility] [address] [place]: stated no. Pt stated spouse Niurka Ortiz [phone] will  when medically safe for discharge./home

## 2023-12-18 NOTE — PHYSICAL THERAPY INITIAL EVALUATION ADULT - TRANSFER TRAINING, PT EVAL
Pt will perform sit to stand transfer I with RW within 1-2 days to promote safety and reduce the risk of falls.

## 2023-12-18 NOTE — PHYSICAL THERAPY INITIAL EVALUATION ADULT - REFERRING PHYSICIAN, REHAB EVAL
Speech Therapy Video Fluoroscopy Swallow Study                                        Video Fluoroscopy Date: 10/15/2020  Referred by: Earline Cho, *, next visit (if known): 11/25/20  Medical Diagnosis (from order):  Dysphagia, unspecified type [R13.10]  Treatment Diagnosis: Dysphagia, Oropharyngeal Phase  Dysphagia, Pharyngoesophageal Phase    Date of Onset/Injury: 8/19/20 (referral date)  Precautions: None  Chart reviewed: Relevant co-morbidities, allergies, tests and medications: Patient was referred by his PCP due to complaints of food getting stuck when swallowing.   Past Medical History:   Diagnosis Date   • Anxiety    • Anxiety    • Coronary artery disease    • Depression    • Failed moderate sedation during procedure    • Fibromyalgia    • Gastro-oesophageal reflux disease     chronic GERD;Cotton's esophagous   • Hematuria, microscopic 10/12/2018    1-3 per hpf. Has been shown on repeat. Referring to urology   • Hyperlipidemia    • Prediabetes 10/12/2018    A1C 5.9 on 10/10/18   • Prostatitis    • RAD (reactive airway disease)     mostly grew out of it - now weather induced   • Seasonal allergies    • Small vessel disease, cerebrovascular 7/11/2018   • Upper abdominal pain 4/25/2017       SUBJECTIVE   Patient reported foods such as bananas and oatmeal get stuck in his throat occasionally. He stated he did not have difficulty swallowing liquids or pills.  Pain: patient reports pain is not an issue/concern    Function:   Limitations (patient reported): swallowing  Prior Level(patient reported): diet: General, Thin Liquids.    Prior Treatment: no therapies in the past year for current condition. Hospitalization, home health services or skilled nursing facility in the last 30 days: No, per patient.    Social Support/Home Environment: Patient lives with significant other.  Patient has assistance as needed from family/friends.       Safety:  Do you feel safe at home, work and/or school? yes, per  patient  Fall History: (fall/near fall in past 12 months): No    Patient Goals/Concerns:  Determine cause of food sticking    OBJECTIVE   Current Status:  Diet: General, Thin Liquids  Dentition:  Intact  Cognition: Intact  Auditory Comprehension: Intact   Verbal Expression: Intact  Feeds Self: Yes  Oral Motor Screen: Assessment of facial, labial, lingual, velum and jaw function tested within normal limits    Videofluoroscopy Results:   Tested In: Lateral View  Consistency Trialed: Thin Liquid; Delivery Method: Cup, Straw  Oral Phase Intact   Pharyngeal Phase Intact   Amount of Residuals none   Location of Residuals not applicable   Amount of Penetration/Aspiration none   Timing of Penetration/Aspiration not applicable   Penetration Aspiration Scale 1 - Material does not enter the airway       Consistency Trialed: Puree; Delivery Method: Teaspoon  Oral Phase Intact   Pharyngeal Phase Intact   Amount of Residuals none   Location of Residuals not applicable   Amount of Penetration/Aspiration none   Timing of Penetration/Aspiration not applicable   Penetration Aspiration Scale 1 - Material does not enter the airway       Consistency Trialed: Solid; Bite Size: average  Oral Phase Piecemeal Swallow   Pharyngeal Phase Intact   Amount of Residuals none   Location of Residuals not applicable   Amount of Penetration/Aspiration none   Timing of Penetration/Aspiration not applicable   Penetration Aspiration Scale 1 - Material does not enter the airway       Consistency Trialed: Pill; Delivery Method: Cup  Oral Phase Slow Oral Transit   Pharyngeal Phase Intact   Amount of Residuals none   Location of Residuals not applicable   Amount of Penetration/Aspiration none   Timing of Penetration/Aspiration not applicable   Penetration Aspiration Scale 1 - Material does not enter the airway         Esophageal Phase:  Not Observed    ASSESSMENT   60 year old male presents to speech therapy with complaint of food sticking in throat. He was  challenged with consistencies as indicated above. Patient exhibited mild oral phase dysphagia characterized by piecemeal swallow with solids and increased oral transit time with pill. Pharyngeal phase was within normal limits. Swallow was timely with good airway protection. No residuals were observed. No laryngeal penetration or aspiration was observed. Results and recommendations were shared with patient. He was encouraged to follow up with his medical team.         Recommendations/Plan:  P.O. Diet Consistency: General, Thin Liquids  Strategies/Guidelines: Alternate liquids/solids, Remain sitting for 30 minutes following PO intake, Pills whole with liquids  Level of Supervision: None Required  Swallow Therapy: No  Repeat Videofluoroscopy: No  Consults: Follow up with medical team    Goals:  to be obtained in one visit:   1. Patient to complete video fluoroscopy evaluation. Status: MET  2. To communicate results to patient.  Status: MET    PLAN   Frequency/Duration: patient seen one time for video swallow study, no further appointments indicated      The plan of care and goals were established with the patient who concurs.  Patient has been given attendance policy at time of initial evaluation.    Patient Education:  Who will be receiving education: patient  Are they ready to learn: yes  Preferred learning style: written, verbal, demonstration  Barriers to learning: no barriers apparent at this time   Result of initial outlined education: Verbalizes understanding    Procedures and total treatment time documented in Time Entry flowsheet.   Kunal Jasmine

## 2023-12-18 NOTE — CARE COORDINATION ASSESSMENT. - OTHER PERTINENT DISCHARGE PLANNING INFORMATION:
Met patient at bedside.  Explained role of CM, verbalized understanding. Pt was made aware a CM will remain available through hospitalization.  Contact information given in discharge/ transitions resource folder. Per pt  lives with spouse and independent w ADL, ambulation, driving and med management prior to admission. Pt drives to appointments, has 1 stairs to enter home, 13 steps inside pt chose [facility] [phone] ,Pt stated he has walker cane commode. CM verified: PCP: Dr. Tracey  Pharmacy:  [facility] [address] : stated no. Pt stated spouse Niurka Ortiz [phone] will  when medically safe for discharge.  Met patient at bedside.  Explained role of CM, verbalized understanding. Pt was made aware a CM will remain available through hospitalization.  Contact information given in discharge/ transitions resource folder. Per pt  lives with spouse and independent w ADL, ambulation, driving and med management prior to admission. Pt drives to appointments, has 1 stairs to enter home, 13 steps inside pt chose [facility] [phone] ,Pt stated he has walker cane commode. CM verified: PCP: Dr. Tracey  Pharmacy:  [facility] [address] : stated no. Pt stated spouse Niurka Ortiz [phone] will  when medically safe for discharge.

## 2023-12-18 NOTE — CONSULT NOTE ADULT - ASSESSMENT
Aftercare s/p knee arthroplasty  - pain control as per ortho team  - IV fluid and postop antibiotics as per ortho team  - PT/OT consult  - VTE prophylaxis as per ortho     Aftercare s/p knee arthroplasty  - pain control as per ortho team  - IV fluid and postop antibiotics as per ortho team  - PT/OT consult  - VTE prophylaxis as per ortho    HTN  -continue home amlodipine    Chronic gout  -continue home allopurinol  -hold home colchicine    Mood disorder  -continue home lamotrigine    Diet: regular  DVT ppx: as per surgery    FULL CODE

## 2023-12-18 NOTE — DISCHARGE NOTE PROVIDER - NSDCMRMEDTOKEN_GEN_ALL_CORE_FT
Adderall 10 mg oral tablet: 1 tab(s) orally once a day as needed for  agitation  allopurinol 200 mg oral tablet: 1 tab(s) orally once a day  amLODIPine 5 mg oral tablet: 1 tab(s) orally once a day  COLCHICINE 0.6MG TAB: orally once a day as needed for  gout pain  lamoTRIgine 200 mg oral tablet, extended release: 1 tab(s) orally once a day  mupirocin 2% topical ointment: Apply topically to affected area 2 times a day apply to both nostrils 2x day for 5 days  VYVANSE 50MG CAP: orally once a day as needed for oth Attention disorder   acetaminophen 500 mg oral tablet: 2 tab(s) orally every 8 hours  Adderall 10 mg oral tablet: 1 tab(s) orally once a day as needed for  agitation  allopurinol 200 mg oral tablet: 1 tab(s) orally once a day  amLODIPine 5 mg oral tablet: 1 tab(s) orally once a day  CeleBREX 200 mg oral capsule: 1 cap(s) orally every 12 hours MDD:2 Take at least 2 hours apart from Ecotrin (Aspirin) MDD: 2  COLCHICINE 0.6MG TAB: orally once a day as needed for  gout pain  Ecotrin Adult Low Strength 81 mg oral delayed release tablet: 1 tab(s) orally every 12 hours MDD: 2  lamoTRIgine 200 mg oral tablet, extended release: 1 tab(s) orally once a day  omeprazole 20 mg oral delayed release capsule: 1 cap(s) orally once a day Take Prior  to a meal/breakfast MDD: 1  oxyCODONE 5 mg oral tablet: 1 tab(s) orally every 4 hours as needed for  moderate pain MDD: 6  VYVANSE 50MG CAP: orally once a day as needed for oth Attention disorder

## 2023-12-18 NOTE — DISCHARGE NOTE PROVIDER - NSDCFUADDINST_GEN_ALL_CORE_FT
- Call your doctor if you experience:  • An increase in pain not controlled by pain medication or change in activity or  position.  • Temperature greater than 101° F.  • Redness, increased swelling or foul smelling drainage from or around the  incision.  • Numbness, tingling or a change in color or temperature of the operative extremity.  • Call your doctor immediately if you experience chest pain, shortness of breath or calf pain.   For Constipation :   • Increase your daily water intake.   • Try adding fiber to your diet by eating fruits, vegetables and foods that are rich in grains.  • If you do experience constipation, you may take an over-the-counter stool softener/laxative such as Colace, Senokot , Milk of Magnesia or Miralax.

## 2023-12-18 NOTE — ASU PATIENT PROFILE, ADULT - FALL HARM RISK - UNIVERSAL INTERVENTIONS
Bed in lowest position, wheels locked, appropriate side rails in place/Call bell, personal items and telephone in reach/Instruct patient to call for assistance before getting out of bed or chair/Non-slip footwear when patient is out of bed/[place] to call system/Physically safe environment - no spills, clutter or unnecessary equipment/Purposeful Proactive Rounding/Room/bathroom lighting operational, light cord in reach Bed in lowest position, wheels locked, appropriate side rails in place/Call bell, personal items and telephone in reach/Instruct patient to call for assistance before getting out of bed or chair/Non-slip footwear when patient is out of bed/[place] to call system/Physically safe environment - no spills, clutter or unnecessary equipment/Purposeful Proactive Rounding/Room/bathroom lighting operational, light cord in reach

## 2023-12-18 NOTE — DISCHARGE NOTE PROVIDER - HOSPITAL COURSE
This 63 year old patient was admitted to [facility] on 12/18/23 with a history of severe degenerative joint disease of the left knee and for elective total joint replacement.  Patient had appropriate preop medical evaluation and testing.    Patient received antibiotics according to SCIP guidelines for infection prevention.  The patient underwent an uncomplicated left knee replacement by  on 12/18/23.   Aspirin was given for DVT prophylaxis.  Anesthesia, Medical Hospitalist, Physical Therapy and Occupational Therapy were consulted.  Patient is stable for discharge with a good prognosis.  Appropriate discharge instructions and medications are provided in this document. This 63 year old patient was admitted to [facility] on 12/18/23 with a history of severe degenerative joint disease of the left knee and for elective total joint replacement.  Patient had appropriate preop medical evaluation and testing.    Patient received antibiotics according to SCIP guidelines for infection prevention.  The patient underwent an uncomplicated left knee replacement by  on 12/18/23.   Aspirin was given for DVT prophylaxis.  Anesthesia, Medical Hospitalist, Physical Therapy and Occupational Therapy were consulted.  Patient is stable for discharge with a good prognosis.  Appropriate discharge instructions and medications are provided in this document. This 63 year old patient was admitted to [facility] on 12/18/23 with a history of severe degenerative joint disease of the left knee and for elective total joint replacement.  Patient had appropriate preop medical evaluation and testing.    Patient received antibiotics according to SCIP guidelines for infection prevention.  The patient underwent an uncomplicated left knee replacement by Dr. Jasmine on 12/18/23.   Aspirin was given for DVT prophylaxis.  Anesthesia, Medical Hospitalist, Physical Therapy and Occupational Therapy were consulted.  Patient is stable for discharge with a good prognosis.  Appropriate discharge instructions and medications are provided in this document. This 63 year old patient was admitted to [facility] on 12/18/23 with a history of severe degenerative joint disease of the left knee and for elective total joint replacement.  Patient had appropriate preop medical evaluation and testing.    Patient received antibiotics according to SCIP guidelines for infection prevention.  The patient underwent an uncomplicated left knee replacement by Dr. Jasmine on 12/18/23.   Aspirin was given for DVT prophylaxis.  Anesthesia, Medical Hospitalist, Physical Therapy and Occupational Therapy were consulted.  Patient is stable for discharge with a good prognosis.  Appropriate discharge instructions and medications are provided in this document.

## 2023-12-18 NOTE — ASU PATIENT PROFILE, ADULT - NSICDXPASTSURGICALHX_GEN_ALL_CORE_FT
PAST SURGICAL HISTORY:  H/O arthroscopy of knee     S/P arthroscopy of left shoulder     S/P arthroscopy of right shoulder

## 2023-12-18 NOTE — PHYSICAL THERAPY INITIAL EVALUATION ADULT - IMPAIRMENTS CONTRIBUTING TO GAIT DEVIATIONS, PT EVAL
impaired balance/decreased flexibility/impaired motor control/decreased sensation/decreased strength

## 2023-12-18 NOTE — PHYSICAL THERAPY INITIAL EVALUATION ADULT - ADDITIONAL COMMENTS
Pt lives in a private home with his wife. Pt reports 1 stair to enter, 12 stairs within. Pt reported PLOF I with ADLs and ambulation.

## 2023-12-18 NOTE — PATIENT CHOICE NOTE. - NSPTCHOICENOTES_GEN_ALL_CORE
D/C resource folder provided at bedside this includes lists for both rehab facilities and home care agencies Pt paolo [facility] [phone]  D/C resource folder provided at bedside this includes lists for both rehab facilities and home care agencies Pt paolo [facility] [phone]

## 2023-12-19 ENCOUNTER — TRANSCRIPTION ENCOUNTER (OUTPATIENT)
Age: 63
End: 2023-12-19

## 2023-12-19 VITALS
TEMPERATURE: 98 F | DIASTOLIC BLOOD PRESSURE: 80 MMHG | SYSTOLIC BLOOD PRESSURE: 125 MMHG | RESPIRATION RATE: 16 BRPM | HEART RATE: 61 BPM | OXYGEN SATURATION: 94 %

## 2023-12-19 LAB
ANION GAP SERPL CALC-SCNC: 10 MMOL/L — SIGNIFICANT CHANGE UP (ref 5–17)
ANION GAP SERPL CALC-SCNC: 10 MMOL/L — SIGNIFICANT CHANGE UP (ref 5–17)
BUN SERPL-MCNC: 19 MG/DL — SIGNIFICANT CHANGE UP (ref 7–23)
BUN SERPL-MCNC: 19 MG/DL — SIGNIFICANT CHANGE UP (ref 7–23)
CALCIUM SERPL-MCNC: 8.8 MG/DL — SIGNIFICANT CHANGE UP (ref 8.4–10.5)
CALCIUM SERPL-MCNC: 8.8 MG/DL — SIGNIFICANT CHANGE UP (ref 8.4–10.5)
CHLORIDE SERPL-SCNC: 106 MMOL/L — SIGNIFICANT CHANGE UP (ref 96–108)
CHLORIDE SERPL-SCNC: 106 MMOL/L — SIGNIFICANT CHANGE UP (ref 96–108)
CO2 SERPL-SCNC: 24 MMOL/L — SIGNIFICANT CHANGE UP (ref 22–31)
CO2 SERPL-SCNC: 24 MMOL/L — SIGNIFICANT CHANGE UP (ref 22–31)
CREAT SERPL-MCNC: 1.02 MG/DL — SIGNIFICANT CHANGE UP (ref 0.5–1.3)
CREAT SERPL-MCNC: 1.02 MG/DL — SIGNIFICANT CHANGE UP (ref 0.5–1.3)
EGFR: 83 ML/MIN/1.73M2 — SIGNIFICANT CHANGE UP
EGFR: 83 ML/MIN/1.73M2 — SIGNIFICANT CHANGE UP
GLUCOSE SERPL-MCNC: 139 MG/DL — HIGH (ref 70–99)
GLUCOSE SERPL-MCNC: 139 MG/DL — HIGH (ref 70–99)
HCT VFR BLD CALC: 36.9 % — LOW (ref 39–50)
HCT VFR BLD CALC: 36.9 % — LOW (ref 39–50)
HGB BLD-MCNC: 12.4 G/DL — LOW (ref 13–17)
HGB BLD-MCNC: 12.4 G/DL — LOW (ref 13–17)
MCHC RBC-ENTMCNC: 28.8 PG — SIGNIFICANT CHANGE UP (ref 27–34)
MCHC RBC-ENTMCNC: 28.8 PG — SIGNIFICANT CHANGE UP (ref 27–34)
MCHC RBC-ENTMCNC: 33.6 GM/DL — SIGNIFICANT CHANGE UP (ref 32–36)
MCHC RBC-ENTMCNC: 33.6 GM/DL — SIGNIFICANT CHANGE UP (ref 32–36)
MCV RBC AUTO: 85.8 FL — SIGNIFICANT CHANGE UP (ref 80–100)
MCV RBC AUTO: 85.8 FL — SIGNIFICANT CHANGE UP (ref 80–100)
NRBC # BLD: 0 /100 WBCS — SIGNIFICANT CHANGE UP (ref 0–0)
NRBC # BLD: 0 /100 WBCS — SIGNIFICANT CHANGE UP (ref 0–0)
PLATELET # BLD AUTO: 261 K/UL — SIGNIFICANT CHANGE UP (ref 150–400)
PLATELET # BLD AUTO: 261 K/UL — SIGNIFICANT CHANGE UP (ref 150–400)
POTASSIUM SERPL-MCNC: 4.1 MMOL/L — SIGNIFICANT CHANGE UP (ref 3.5–5.3)
POTASSIUM SERPL-MCNC: 4.1 MMOL/L — SIGNIFICANT CHANGE UP (ref 3.5–5.3)
POTASSIUM SERPL-SCNC: 4.1 MMOL/L — SIGNIFICANT CHANGE UP (ref 3.5–5.3)
POTASSIUM SERPL-SCNC: 4.1 MMOL/L — SIGNIFICANT CHANGE UP (ref 3.5–5.3)
RBC # BLD: 4.3 M/UL — SIGNIFICANT CHANGE UP (ref 4.2–5.8)
RBC # BLD: 4.3 M/UL — SIGNIFICANT CHANGE UP (ref 4.2–5.8)
RBC # FLD: 12.6 % — SIGNIFICANT CHANGE UP (ref 10.3–14.5)
RBC # FLD: 12.6 % — SIGNIFICANT CHANGE UP (ref 10.3–14.5)
SODIUM SERPL-SCNC: 140 MMOL/L — SIGNIFICANT CHANGE UP (ref 135–145)
SODIUM SERPL-SCNC: 140 MMOL/L — SIGNIFICANT CHANGE UP (ref 135–145)
WBC # BLD: 17.37 K/UL — HIGH (ref 3.8–10.5)
WBC # BLD: 17.37 K/UL — HIGH (ref 3.8–10.5)
WBC # FLD AUTO: 17.37 K/UL — HIGH (ref 3.8–10.5)
WBC # FLD AUTO: 17.37 K/UL — HIGH (ref 3.8–10.5)

## 2023-12-19 PROCEDURE — 20985 CPTR-ASST DIR MS PX: CPT

## 2023-12-19 PROCEDURE — 27447 TOTAL KNEE ARTHROPLASTY: CPT | Mod: LT

## 2023-12-19 PROCEDURE — C1713: CPT

## 2023-12-19 PROCEDURE — 73560 X-RAY EXAM OF KNEE 1 OR 2: CPT

## 2023-12-19 PROCEDURE — 97535 SELF CARE MNGMENT TRAINING: CPT

## 2023-12-19 PROCEDURE — 97165 OT EVAL LOW COMPLEX 30 MIN: CPT

## 2023-12-19 PROCEDURE — 97530 THERAPEUTIC ACTIVITIES: CPT

## 2023-12-19 PROCEDURE — 85027 COMPLETE CBC AUTOMATED: CPT

## 2023-12-19 PROCEDURE — C1776: CPT

## 2023-12-19 PROCEDURE — C1889: CPT

## 2023-12-19 PROCEDURE — 97161 PT EVAL LOW COMPLEX 20 MIN: CPT

## 2023-12-19 PROCEDURE — 97116 GAIT TRAINING THERAPY: CPT

## 2023-12-19 PROCEDURE — 97110 THERAPEUTIC EXERCISES: CPT

## 2023-12-19 PROCEDURE — 99238 HOSP IP/OBS DSCHRG MGMT 30/<: CPT

## 2023-12-19 PROCEDURE — 80048 BASIC METABOLIC PNL TOTAL CA: CPT

## 2023-12-19 PROCEDURE — 36415 COLL VENOUS BLD VENIPUNCTURE: CPT

## 2023-12-19 RX ORDER — OMEPRAZOLE 10 MG/1
1 CAPSULE, DELAYED RELEASE ORAL
Qty: 30 | Refills: 0
Start: 2023-12-19 | End: 2024-01-17

## 2023-12-19 RX ORDER — ACETAMINOPHEN 500 MG
2 TABLET ORAL
Qty: 0 | Refills: 0 | DISCHARGE
Start: 2023-12-19 | End: 2024-01-02

## 2023-12-19 RX ORDER — CELECOXIB 200 MG/1
1 CAPSULE ORAL
Qty: 60 | Refills: 0
Start: 2023-12-19 | End: 2024-01-17

## 2023-12-19 RX ORDER — OXYCODONE HYDROCHLORIDE 5 MG/1
1 TABLET ORAL
Qty: 42 | Refills: 0
Start: 2023-12-19 | End: 2023-12-25

## 2023-12-19 RX ADMIN — Medication 1000 MILLIGRAM(S): at 05:25

## 2023-12-19 RX ADMIN — Medication 1000 MILLIGRAM(S): at 13:45

## 2023-12-19 RX ADMIN — CELECOXIB 200 MILLIGRAM(S): 200 CAPSULE ORAL at 09:58

## 2023-12-19 RX ADMIN — Medication 100 MILLIGRAM(S): at 01:35

## 2023-12-19 RX ADMIN — Medication 81 MILLIGRAM(S): at 05:25

## 2023-12-19 RX ADMIN — Medication 101.6 MILLIGRAM(S): at 05:25

## 2023-12-19 RX ADMIN — Medication 200 MILLIGRAM(S): at 12:49

## 2023-12-19 RX ADMIN — PANTOPRAZOLE SODIUM 40 MILLIGRAM(S): 20 TABLET, DELAYED RELEASE ORAL at 05:25

## 2023-12-19 RX ADMIN — LAMOTRIGINE 200 MILLIGRAM(S): 25 TABLET, ORALLY DISINTEGRATING ORAL at 12:48

## 2023-12-19 RX ADMIN — Medication 1000 MILLIGRAM(S): at 05:48

## 2023-12-19 NOTE — PHARMACOTHERAPY INTERVENTION NOTE - COMMENTS
Admission medication reconciliation POD1  
Meds to Bed (M2B) received from [facility] pharmacy were delivered to patient at bed side.  Pharmacy Staff did a final review/inquiry with the patient to ensure understanding and use of medication that was provided. Patient questions or concerns about their discharge drug therapy were addressed for their transition home.  All issues were addressed and well wishes provided.  
Transition of Care video discharge education - medication calendar given to patient

## 2023-12-19 NOTE — PROGRESS NOTE ADULT - SUBJECTIVE AND OBJECTIVE BOX
Patient is a 63y old  Male who presents with a chief complaint of Left total knee replacement (18 Dec 2023 15:12)      INTERVAL HPI/OVERNIGHT EVENTS:  Patient seen awake in chair. He reports ambulating     MEDICATIONS  (STANDING):  acetaminophen     Tablet .. 1000 milliGRAM(s) Oral every 8 hours  acetaminophen   IVPB .. 1000 milliGRAM(s) IV Intermittent once  allopurinol 200 milliGRAM(s) Oral daily  aspirin enteric coated 81 milliGRAM(s) Oral every 12 hours  celecoxib 200 milliGRAM(s) Oral every 12 hours  lactated ringers. 1000 milliLiter(s) (75 mL/Hr) IV Continuous <Continuous>  lactated ringers. 1000 milliLiter(s) (100 mL/Hr) IV Continuous <Continuous>  lactated ringers. 1000 milliLiter(s) (75 mL/Hr) IV Continuous <Continuous>  lamoTRIgine 200 milliGRAM(s) Oral daily  pantoprazole    Tablet 40 milliGRAM(s) Oral before breakfast  polyethylene glycol 3350 17 Gram(s) Oral at bedtime  senna 2 Tablet(s) Oral at bedtime  tranexamic acid IVPB 1000 milliGRAM(s) IV Intermittent once  tranexamic acid IVPB 1000 milliGRAM(s) IV Intermittent once    MEDICATIONS  (PRN):  HYDROmorphone  Injectable 1 milliGRAM(s) IV Push every 10 minutes PRN Severe Pain (7 - 10)  HYDROmorphone  Injectable 0.5 milliGRAM(s) IV Push every 3 hours PRN Breakthrough pain  HYDROmorphone  Injectable 0.5 milliGRAM(s) IV Push every 10 minutes PRN Moderate Pain (4 - 6)  magnesium hydroxide Suspension 30 milliLiter(s) Oral daily PRN Constipation  ondansetron Injectable 4 milliGRAM(s) IV Push once PRN Nausea and/or Vomiting  ondansetron Injectable 4 milliGRAM(s) IV Push once PRN Nausea and/or Vomiting  ondansetron Injectable 4 milliGRAM(s) IV Push every 6 hours PRN Nausea and/or Vomiting  oxyCODONE    IR 5 milliGRAM(s) Oral every 3 hours PRN Moderate Pain (4 - 6)  oxyCODONE    IR 10 milliGRAM(s) Oral every 3 hours PRN Severe Pain (7 - 10)      Allergies    No Known Allergies    Intolerances        REVIEW OF SYSTEMS:  CONSTITUTIONAL: No fever, weight loss, or fatigue  EYES: No eye pain, visual disturbances, or discharge  ENMT:  No difficulty hearing, tinnitus, vertigo; No sinus or throat pain  NECK: No pain or stiffness  RESPIRATORY: No cough, wheezing, chills or hemoptysis; No shortness of breath  CARDIOVASCULAR: No chest pain, palpitations, dizziness, or leg swelling  GASTROINTESTINAL: No abdominal or epigastric pain. No nausea, vomiting, or hematemesis; No diarrhea or constipation. No melena or hematochezia.  GENITOURINARY: No dysuria, frequency, hematuria, or incontinence  NEUROLOGICAL: No headaches, memory loss, loss of strength, numbness, or tremors  SKIN: No itching, burning, rashes, or lesions   LYMPH NODES: No enlarged glands  ENDOCRINE: No heat or cold intolerance; No hair loss; No polydipsia or polyuria  MUSCULOSKELETAL: No joint pain or swelling; No muscle, back, or extremity pain  HEME/LYMPH: No easy bruising, or bleeding gums  ALLERGY AND IMMUNOLOGIC: No hives or eczema      GENERAL: patient appears well, no acute distress, appropriate behavior  EYES: sclera clear, no exudates, PERRLA  ENMT: moist mucous membranes, oropharynx clear without erythema, no exudates  LUNGS:  no increased work of breathing, no wheezing appreciated  HEART: no lower extremity edema appreciated  GASTROINTESTINAL: abdomen is soft, nontender, nondistended, no palpable masses appreciated  INTEGUMENT: good skin turgor, warm, dry and intact, no lesions appreciated  MUSCULOSKELETAL: no clubbing or cyanosis, no obvious deformity  NEUROLOGIC: awake, alert, oriented x3, strength no obvious sensory deficits  PSYCHIATRIC: mood is good, affect is congruent, linear and logical thought process  HEME/LYMPH:  no obvious ecchymosis or petechiae        Vital Signs Last 24 Hrs  T(C): 36.5 (19 Dec 2023 08:11), Max: 37.1 (18 Dec 2023 14:03)  T(F): 97.7 (19 Dec 2023 08:11), Max: 98.7 (18 Dec 2023 14:03)  HR: 61 (19 Dec 2023 08:11) (60 - 81)  BP: 125/80 (19 Dec 2023 08:11) (92/56 - 127/78)  BP(mean): --  RR: 16 (19 Dec 2023 08:11) (16 - 20)  SpO2: 94% (19 Dec 2023 08:11) (94% - 100%)    Parameters below as of 19 Dec 2023 08:11  Patient On (Oxygen Delivery Method): room air        LABS:                        12.4   17.37 )-----------( 261      ( 19 Dec 2023 07:49 )             36.9     19 Dec 2023 07:49    140    |  106    |  19     ----------------------------<  139    4.1     |  24     |  1.02     Ca    8.8        19 Dec 2023 07:49        Urinalysis Basic - ( 19 Dec 2023 07:49 )    Color: x / Appearance: x / SG: x / pH: x  Gluc: 139 mg/dL / Ketone: x  / Bili: x / Urobili: x   Blood: x / Protein: x / Nitrite: x   Leuk Esterase: x / RBC: x / WBC x   Sq Epi: x / Non Sq Epi: x / Bacteria: x      CAPILLARY BLOOD GLUCOSE       Patient is a 63y old  Male who presents with a chief complaint of Left total knee replacement (18 Dec 2023 15:12)      INTERVAL HPI/OVERNIGHT EVENTS:  Patient seen awake in chair. He reports ambulating with PT/OT. No reported overnight events.     MEDICATIONS  (STANDING):  acetaminophen     Tablet .. 1000 milliGRAM(s) Oral every 8 hours  acetaminophen   IVPB .. 1000 milliGRAM(s) IV Intermittent once  allopurinol 200 milliGRAM(s) Oral daily  aspirin enteric coated 81 milliGRAM(s) Oral every 12 hours  celecoxib 200 milliGRAM(s) Oral every 12 hours  lactated ringers. 1000 milliLiter(s) (75 mL/Hr) IV Continuous <Continuous>  lactated ringers. 1000 milliLiter(s) (100 mL/Hr) IV Continuous <Continuous>  lactated ringers. 1000 milliLiter(s) (75 mL/Hr) IV Continuous <Continuous>  lamoTRIgine 200 milliGRAM(s) Oral daily  pantoprazole    Tablet 40 milliGRAM(s) Oral before breakfast  polyethylene glycol 3350 17 Gram(s) Oral at bedtime  senna 2 Tablet(s) Oral at bedtime  tranexamic acid IVPB 1000 milliGRAM(s) IV Intermittent once  tranexamic acid IVPB 1000 milliGRAM(s) IV Intermittent once    MEDICATIONS  (PRN):  HYDROmorphone  Injectable 1 milliGRAM(s) IV Push every 10 minutes PRN Severe Pain (7 - 10)  HYDROmorphone  Injectable 0.5 milliGRAM(s) IV Push every 3 hours PRN Breakthrough pain  HYDROmorphone  Injectable 0.5 milliGRAM(s) IV Push every 10 minutes PRN Moderate Pain (4 - 6)  magnesium hydroxide Suspension 30 milliLiter(s) Oral daily PRN Constipation  ondansetron Injectable 4 milliGRAM(s) IV Push once PRN Nausea and/or Vomiting  ondansetron Injectable 4 milliGRAM(s) IV Push once PRN Nausea and/or Vomiting  ondansetron Injectable 4 milliGRAM(s) IV Push every 6 hours PRN Nausea and/or Vomiting  oxyCODONE    IR 5 milliGRAM(s) Oral every 3 hours PRN Moderate Pain (4 - 6)  oxyCODONE    IR 10 milliGRAM(s) Oral every 3 hours PRN Severe Pain (7 - 10)      Allergies    No Known Allergies    Intolerances        REVIEW OF SYSTEMS:  CONSTITUTIONAL: No fever, weight loss, or fatigue  EYES: No eye pain, visual disturbances, or discharge  ENMT:  No difficulty hearing, tinnitus, vertigo; No sinus or throat pain  NECK: No pain or stiffness  RESPIRATORY: No cough, wheezing, chills or hemoptysis; No shortness of breath  CARDIOVASCULAR: No chest pain, palpitations, dizziness, or leg swelling  GASTROINTESTINAL: No abdominal or epigastric pain. No nausea, vomiting, or hematemesis; No diarrhea or constipation. No melena or hematochezia.  GENITOURINARY: No dysuria, frequency, hematuria, or incontinence  NEUROLOGICAL: No headaches, memory loss, loss of strength, numbness, or tremors  SKIN: No itching, burning, rashes, or lesions   LYMPH NODES: No enlarged glands  ENDOCRINE: No heat or cold intolerance; No hair loss; No polydipsia or polyuria  MUSCULOSKELETAL: No joint pain or swelling; No muscle, back, or extremity pain      GENERAL: patient appears well, no acute distress, appropriate behavior  EYES: sclera clear, no exudates, PERRLA  ENMT: moist mucous membranes, oropharynx clear without erythema, no exudates  LUNGS:  no increased work of breathing, no wheezing appreciated  HEART: no lower extremity edema appreciated  GASTROINTESTINAL: abdomen is soft, nontender, nondistended, no palpable masses appreciated  INTEGUMENT: good skin turgor, warm, dry and intact, no lesions appreciated  MUSCULOSKELETAL: no clubbing or cyanosis, no obvious deformity  NEUROLOGIC: awake, alert, oriented x3, strength no obvious sensory deficits  PSYCHIATRIC: mood is good, affect is congruent, linear and logical thought process  HEME/LYMPH:  no obvious ecchymosis or petechiae        Vital Signs Last 24 Hrs  T(C): 36.5 (19 Dec 2023 08:11), Max: 37.1 (18 Dec 2023 14:03)  T(F): 97.7 (19 Dec 2023 08:11), Max: 98.7 (18 Dec 2023 14:03)  HR: 61 (19 Dec 2023 08:11) (60 - 81)  BP: 125/80 (19 Dec 2023 08:11) (92/56 - 127/78)  BP(mean): --  RR: 16 (19 Dec 2023 08:11) (16 - 20)  SpO2: 94% (19 Dec 2023 08:11) (94% - 100%)    Parameters below as of 19 Dec 2023 08:11  Patient On (Oxygen Delivery Method): room air        LABS:                        12.4   17.37 )-----------( 261      ( 19 Dec 2023 07:49 )             36.9     19 Dec 2023 07:49    140    |  106    |  19     ----------------------------<  139    4.1     |  24     |  1.02     Ca    8.8        19 Dec 2023 07:49        Urinalysis Basic - ( 19 Dec 2023 07:49 )    Color: x / Appearance: x / SG: x / pH: x  Gluc: 139 mg/dL / Ketone: x  / Bili: x / Urobili: x   Blood: x / Protein: x / Nitrite: x   Leuk Esterase: x / RBC: x / WBC x   Sq Epi: x / Non Sq Epi: x / Bacteria: x      CAPILLARY BLOOD GLUCOSE

## 2023-12-19 NOTE — DISCHARGE NOTE NURSING/CASE MANAGEMENT/SOCIAL WORK - NSSCCONTNUM_GEN_ALL_CORE
[facility] ( / [phone] ) [facility] ( / [phone] )

## 2023-12-19 NOTE — DISCHARGE NOTE NURSING/CASE MANAGEMENT/SOCIAL WORK - PATIENT PORTAL LINK FT
You can access the FollowMyHealth Patient Portal offered by [facility] by registering at the following website: [email or web address]. By joining [employer]’s FollowMyHealth portal, you will also be able to view your health information using other applications (apps) compatible with our system. You can access the FollowMyHealth Patient Portal offered by [facility] by registering at the following website: [email or web address]. By joining [employer]’s FollowMyHealth portal, you will also be able to view your health information using other applications (apps) compatible with our system. [employer] Counseling:  I discussed with the patient the risks of mycophenolate mofetil including but not limited to infection/immunosuppression, GI upset, hypokalemia, hypercholesterolemia, bone marrow suppression, lymphoproliferative disorders, malignancy, GI ulceration/bleed/perforation, colitis, interstitial lung disease, kidney failure, progressive multifocal leukoencephalopathy, and birth defects.  The patient understands that monitoring is required including a baseline creatinine and regular CBC testing. In addition, patient must alert us immediately if symptoms of infection or other concerning signs are noted.

## 2023-12-19 NOTE — PROGRESS NOTE ADULT - SUBJECTIVE AND OBJECTIVE BOX
ORTHOPEDIC PA PROGRESS NOTE  ELIZABETH BAEZ      63y Male                                 SY 2WST [id number]                                                                                                                           POD #    1d    STATUS POST:       Procedure: Left total knee replacement               Patient seen and examined at bedside.  no complaints    Current Pain Management:    acetaminophen     Tablet .. 1000 milliGRAM(s) Oral every 8 hours  acetaminophen   IVPB .. 1000 milliGRAM(s) IV Intermittent once  celecoxib 200 milliGRAM(s) Oral every 12 hours  HYDROmorphone  Injectable 1 milliGRAM(s) IV Push every 10 minutes PRN  HYDROmorphone  Injectable 0.5 milliGRAM(s) IV Push every 3 hours PRN  HYDROmorphone  Injectable 0.5 milliGRAM(s) IV Push every 10 minutes PRN  lamoTRIgine 200 milliGRAM(s) Oral daily  ondansetron Injectable 4 milliGRAM(s) IV Push once PRN  ondansetron Injectable 4 milliGRAM(s) IV Push once PRN  ondansetron Injectable 4 milliGRAM(s) IV Push every 6 hours PRN  oxyCODONE    IR 5 milliGRAM(s) Oral every 3 hours PRN  oxyCODONE    IR 10 milliGRAM(s) Oral every 3 hours PRN      T(F): 97.7  HR: 66  BP: 112/57  RR: 18  SpO2: 94%               12-18-23 @ 07:01  -  12-19-23 @ 07:00  --------------------------------------------------------  IN:    Lactated Ringers: 1230 mL    Lactated Ringers: 1100 mL    Sodium Chloride 0.9% Bolus: 500 mL  Total IN: 2830 mL    OUT:    Voided (mL): 1300 mL  Total OUT: 1300 mL    Total NET: 1530 mL        Physical Exam :    -   Dressing mepilex C/D/I.   -   Distal Neurvascular status intact grossly.   -   Warm well perfused; capillary refill <3 seconds   -   (+)EHL/FHL   -   (+) Sensation to light touch  -   (-) Calf tenderness Bilaterally      A/P: 63y Male s/p Left total knee replacement       -   Ortho Stable  -   Pain control:  acetaminophen     Tablet .. 1000 milliGRAM(s) Oral every 8 hours  acetaminophen   IVPB .. 1000 milliGRAM(s) IV Intermittent once  celecoxib 200 milliGRAM(s) Oral every 12 hours  HYDROmorphone  Injectable 0.5 milliGRAM(s) IV Push every 3 hours PRN  HYDROmorphone  Injectable 1 milliGRAM(s) IV Push every 10 minutes PRN  HYDROmorphone  Injectable 0.5 milliGRAM(s) IV Push every 10 minutes PRN  lamoTRIgine 200 milliGRAM(s) Oral daily  ondansetron Injectable 4 milliGRAM(s) IV Push once PRN  ondansetron Injectable 4 milliGRAM(s) IV Push once PRN  ondansetron Injectable 4 milliGRAM(s) IV Push every 6 hours PRN  oxyCODONE    IR 5 milliGRAM(s) Oral every 3 hours PRN  oxyCODONE    IR 10 milliGRAM(s) Oral every 3 hours PRN    -   Medicine to follow  -   DVT ppx:    PAS +  aspirin enteric coated: 81 milliGRAM(s) Oral    -   PT/OT OOB,  Weight bearing status: WBAT   -  Dispo:  home today pending  -   Prescribed Medications:  mupirocin 2% topical ointment: Apply topically to affected area 2 times a day apply to both nostrils 2x day for 5 days

## 2023-12-19 NOTE — PROGRESS NOTE ADULT - ASSESSMENT
Aftercare s/p knee arthroplasty  - pain control as per ortho team  - IV fluid and postop antibiotics as per ortho team  - PT/OT consult  - VTE prophylaxis as per ortho    HTN  -continue home amlodipine    Chronic gout  -continue home allopurinol  -hold home colchicine    Mood disorder  -continue home lamotrigine    Diet: regular  DVT ppx: as per surgery    FULL CODE Aftercare s/p knee arthroplasty  - pain control as per ortho team  - IV fluid and postop antibiotics as per ortho team  - PT/OT consult  - VTE prophylaxis as per ortho  - medically stable for discharge    HTN  -continue home amlodipine    Chronic gout  -continue home allopurinol  -hold home colchicine    Mood disorder  -continue home lamotrigine    Diet: regular  DVT ppx: as per surgery    FULL CODE

## 2023-12-19 NOTE — DISCHARGE NOTE NURSING/CASE MANAGEMENT/SOCIAL WORK - NSSCTYPOFSERV_GEN_ALL_CORE
[facility]/ Home Care Services with [facility] ( / [phone] )  Home Care RN will call within 24/48 hrs of DC from the hospital to set up Initial Assessment.     [facility]/ Home Care Services with [facility] ( / [phone] )  Home Care RN will call within 24/48 hrs of DC from the hospital to set up Initial Assessment.

## 2023-12-19 NOTE — DISCHARGE NOTE NURSING/CASE MANAGEMENT/SOCIAL WORK - NSSCNAMETXT_GEN_ALL_CORE
[facility] ( / [phone] ) [facility] [place] ( / [phone] )

## 2023-12-19 NOTE — DISCHARGE NOTE NURSING/CASE MANAGEMENT/SOCIAL WORK - NSDCPEFALRISK_GEN_ALL_CORE
For information on Fall & Injury Prevention, visit: [email or web address] OR  [email or web address] OR  https://www.cdc.gov/steadi/patient.html For information on Fall & Injury Prevention, visit: [email or web address] OR  [email or web address] OR  https://www.cdc.gov/steadi/patient.html

## 2023-12-20 RX ORDER — ASPIRIN/CALCIUM CARB/MAGNESIUM 324 MG
1 TABLET ORAL
Qty: 56 | Refills: 0
Start: 2023-12-20 | End: 2024-01-16

## 2023-12-24 ENCOUNTER — INPATIENT (INPATIENT)
Facility: HOSPITAL | Age: 63
LOS: 7 days | Discharge: HOME | DRG: 603 | End: 2024-01-01
Attending: STUDENT IN AN ORGANIZED HEALTH CARE EDUCATION/TRAINING PROGRAM | Admitting: STUDENT IN AN ORGANIZED HEALTH CARE EDUCATION/TRAINING PROGRAM
Payer: COMMERCIAL

## 2023-12-24 VITALS
HEART RATE: 75 BPM | HEIGHT: 69 IN | WEIGHT: 246.92 LBS | TEMPERATURE: 98 F | DIASTOLIC BLOOD PRESSURE: 81 MMHG | SYSTOLIC BLOOD PRESSURE: 144 MMHG | RESPIRATION RATE: 16 BRPM | OXYGEN SATURATION: 96 %

## 2023-12-24 DIAGNOSIS — Z98.890 OTHER SPECIFIED POSTPROCEDURAL STATES: Chronic | ICD-10-CM

## 2023-12-24 DIAGNOSIS — M25.569 PAIN IN UNSPECIFIED KNEE: ICD-10-CM

## 2023-12-24 LAB
ALBUMIN SERPL ELPH-MCNC: 3.7 G/DL — SIGNIFICANT CHANGE UP (ref 3.3–5)
ALBUMIN SERPL ELPH-MCNC: 3.7 G/DL — SIGNIFICANT CHANGE UP (ref 3.3–5)
ALP SERPL-CCNC: 79 U/L — SIGNIFICANT CHANGE UP (ref 30–120)
ALP SERPL-CCNC: 79 U/L — SIGNIFICANT CHANGE UP (ref 30–120)
ALT FLD-CCNC: 34 U/L — SIGNIFICANT CHANGE UP (ref 10–60)
ALT FLD-CCNC: 34 U/L — SIGNIFICANT CHANGE UP (ref 10–60)
ANION GAP SERPL CALC-SCNC: 11 MMOL/L — SIGNIFICANT CHANGE UP (ref 5–17)
ANION GAP SERPL CALC-SCNC: 11 MMOL/L — SIGNIFICANT CHANGE UP (ref 5–17)
APTT BLD: 30 SEC — SIGNIFICANT CHANGE UP (ref 24.5–35.6)
APTT BLD: 30 SEC — SIGNIFICANT CHANGE UP (ref 24.5–35.6)
AST SERPL-CCNC: 26 U/L — SIGNIFICANT CHANGE UP (ref 10–40)
AST SERPL-CCNC: 26 U/L — SIGNIFICANT CHANGE UP (ref 10–40)
BASOPHILS # BLD AUTO: 0.03 K/UL — SIGNIFICANT CHANGE UP (ref 0–0.2)
BASOPHILS # BLD AUTO: 0.03 K/UL — SIGNIFICANT CHANGE UP (ref 0–0.2)
BASOPHILS NFR BLD AUTO: 0.3 % — SIGNIFICANT CHANGE UP (ref 0–2)
BASOPHILS NFR BLD AUTO: 0.3 % — SIGNIFICANT CHANGE UP (ref 0–2)
BILIRUB SERPL-MCNC: 0.6 MG/DL — SIGNIFICANT CHANGE UP (ref 0.2–1.2)
BILIRUB SERPL-MCNC: 0.6 MG/DL — SIGNIFICANT CHANGE UP (ref 0.2–1.2)
BUN SERPL-MCNC: 24 MG/DL — HIGH (ref 7–23)
BUN SERPL-MCNC: 24 MG/DL — HIGH (ref 7–23)
CALCIUM SERPL-MCNC: 9.3 MG/DL — SIGNIFICANT CHANGE UP (ref 8.4–10.5)
CALCIUM SERPL-MCNC: 9.3 MG/DL — SIGNIFICANT CHANGE UP (ref 8.4–10.5)
CHLORIDE SERPL-SCNC: 100 MMOL/L — SIGNIFICANT CHANGE UP (ref 96–108)
CHLORIDE SERPL-SCNC: 100 MMOL/L — SIGNIFICANT CHANGE UP (ref 96–108)
CO2 SERPL-SCNC: 28 MMOL/L — SIGNIFICANT CHANGE UP (ref 22–31)
CO2 SERPL-SCNC: 28 MMOL/L — SIGNIFICANT CHANGE UP (ref 22–31)
CREAT SERPL-MCNC: 0.81 MG/DL — SIGNIFICANT CHANGE UP (ref 0.5–1.3)
CREAT SERPL-MCNC: 0.81 MG/DL — SIGNIFICANT CHANGE UP (ref 0.5–1.3)
EGFR: 100 ML/MIN/1.73M2 — SIGNIFICANT CHANGE UP
EGFR: 100 ML/MIN/1.73M2 — SIGNIFICANT CHANGE UP
EOSINOPHIL # BLD AUTO: 0.17 K/UL — SIGNIFICANT CHANGE UP (ref 0–0.5)
EOSINOPHIL # BLD AUTO: 0.17 K/UL — SIGNIFICANT CHANGE UP (ref 0–0.5)
EOSINOPHIL NFR BLD AUTO: 1.5 % — SIGNIFICANT CHANGE UP (ref 0–6)
EOSINOPHIL NFR BLD AUTO: 1.5 % — SIGNIFICANT CHANGE UP (ref 0–6)
GLUCOSE SERPL-MCNC: 126 MG/DL — HIGH (ref 70–99)
GLUCOSE SERPL-MCNC: 126 MG/DL — HIGH (ref 70–99)
HCT VFR BLD CALC: 39 % — SIGNIFICANT CHANGE UP (ref 39–50)
HCT VFR BLD CALC: 39 % — SIGNIFICANT CHANGE UP (ref 39–50)
HGB BLD-MCNC: 13 G/DL — SIGNIFICANT CHANGE UP (ref 13–17)
HGB BLD-MCNC: 13 G/DL — SIGNIFICANT CHANGE UP (ref 13–17)
IMM GRANULOCYTES NFR BLD AUTO: 0.5 % — SIGNIFICANT CHANGE UP (ref 0–0.9)
IMM GRANULOCYTES NFR BLD AUTO: 0.5 % — SIGNIFICANT CHANGE UP (ref 0–0.9)
INR BLD: 1.09 RATIO — SIGNIFICANT CHANGE UP (ref 0.85–1.18)
INR BLD: 1.09 RATIO — SIGNIFICANT CHANGE UP (ref 0.85–1.18)
LACTATE SERPL-SCNC: 1.3 MMOL/L — SIGNIFICANT CHANGE UP (ref 0.7–2)
LACTATE SERPL-SCNC: 1.3 MMOL/L — SIGNIFICANT CHANGE UP (ref 0.7–2)
LYMPHOCYTES # BLD AUTO: 1.11 K/UL — SIGNIFICANT CHANGE UP (ref 1–3.3)
LYMPHOCYTES # BLD AUTO: 1.11 K/UL — SIGNIFICANT CHANGE UP (ref 1–3.3)
LYMPHOCYTES # BLD AUTO: 10.1 % — LOW (ref 13–44)
LYMPHOCYTES # BLD AUTO: 10.1 % — LOW (ref 13–44)
MCHC RBC-ENTMCNC: 28.9 PG — SIGNIFICANT CHANGE UP (ref 27–34)
MCHC RBC-ENTMCNC: 28.9 PG — SIGNIFICANT CHANGE UP (ref 27–34)
MCHC RBC-ENTMCNC: 33.3 GM/DL — SIGNIFICANT CHANGE UP (ref 32–36)
MCHC RBC-ENTMCNC: 33.3 GM/DL — SIGNIFICANT CHANGE UP (ref 32–36)
MCV RBC AUTO: 86.7 FL — SIGNIFICANT CHANGE UP (ref 80–100)
MCV RBC AUTO: 86.7 FL — SIGNIFICANT CHANGE UP (ref 80–100)
MONOCYTES # BLD AUTO: 0.85 K/UL — SIGNIFICANT CHANGE UP (ref 0–0.9)
MONOCYTES # BLD AUTO: 0.85 K/UL — SIGNIFICANT CHANGE UP (ref 0–0.9)
MONOCYTES NFR BLD AUTO: 7.7 % — SIGNIFICANT CHANGE UP (ref 2–14)
MONOCYTES NFR BLD AUTO: 7.7 % — SIGNIFICANT CHANGE UP (ref 2–14)
NEUTROPHILS # BLD AUTO: 8.79 K/UL — HIGH (ref 1.8–7.4)
NEUTROPHILS # BLD AUTO: 8.79 K/UL — HIGH (ref 1.8–7.4)
NEUTROPHILS NFR BLD AUTO: 79.9 % — HIGH (ref 43–77)
NEUTROPHILS NFR BLD AUTO: 79.9 % — HIGH (ref 43–77)
NRBC # BLD: 0 /100 WBCS — SIGNIFICANT CHANGE UP (ref 0–0)
NRBC # BLD: 0 /100 WBCS — SIGNIFICANT CHANGE UP (ref 0–0)
PLATELET # BLD AUTO: 306 K/UL — SIGNIFICANT CHANGE UP (ref 150–400)
PLATELET # BLD AUTO: 306 K/UL — SIGNIFICANT CHANGE UP (ref 150–400)
POTASSIUM SERPL-MCNC: 4.4 MMOL/L — SIGNIFICANT CHANGE UP (ref 3.5–5.3)
POTASSIUM SERPL-MCNC: 4.4 MMOL/L — SIGNIFICANT CHANGE UP (ref 3.5–5.3)
POTASSIUM SERPL-SCNC: 4.4 MMOL/L — SIGNIFICANT CHANGE UP (ref 3.5–5.3)
POTASSIUM SERPL-SCNC: 4.4 MMOL/L — SIGNIFICANT CHANGE UP (ref 3.5–5.3)
PROT SERPL-MCNC: 7.1 G/DL — SIGNIFICANT CHANGE UP (ref 6–8.3)
PROT SERPL-MCNC: 7.1 G/DL — SIGNIFICANT CHANGE UP (ref 6–8.3)
PROTHROM AB SERPL-ACNC: 12.2 SEC — SIGNIFICANT CHANGE UP (ref 9.5–13)
PROTHROM AB SERPL-ACNC: 12.2 SEC — SIGNIFICANT CHANGE UP (ref 9.5–13)
RBC # BLD: 4.5 M/UL — SIGNIFICANT CHANGE UP (ref 4.2–5.8)
RBC # BLD: 4.5 M/UL — SIGNIFICANT CHANGE UP (ref 4.2–5.8)
RBC # FLD: 12.7 % — SIGNIFICANT CHANGE UP (ref 10.3–14.5)
RBC # FLD: 12.7 % — SIGNIFICANT CHANGE UP (ref 10.3–14.5)
SODIUM SERPL-SCNC: 139 MMOL/L — SIGNIFICANT CHANGE UP (ref 135–145)
SODIUM SERPL-SCNC: 139 MMOL/L — SIGNIFICANT CHANGE UP (ref 135–145)
WBC # BLD: 11 K/UL — HIGH (ref 3.8–10.5)
WBC # BLD: 11 K/UL — HIGH (ref 3.8–10.5)
WBC # FLD AUTO: 11 K/UL — HIGH (ref 3.8–10.5)
WBC # FLD AUTO: 11 K/UL — HIGH (ref 3.8–10.5)

## 2023-12-24 PROCEDURE — 99221 1ST HOSP IP/OBS SF/LOW 40: CPT

## 2023-12-24 PROCEDURE — 93971 EXTREMITY STUDY: CPT | Mod: 26,LT

## 2023-12-24 PROCEDURE — 99285 EMERGENCY DEPT VISIT HI MDM: CPT

## 2023-12-24 PROCEDURE — 99223 1ST HOSP IP/OBS HIGH 75: CPT

## 2023-12-24 PROCEDURE — 73562 X-RAY EXAM OF KNEE 3: CPT | Mod: 26,LT

## 2023-12-24 RX ORDER — LAMOTRIGINE 25 MG/1
1 TABLET, ORALLY DISINTEGRATING ORAL
Refills: 0 | DISCHARGE

## 2023-12-24 RX ORDER — LISDEXAMFETAMINE DIMESYLATE 70 MG/1
0 CAPSULE ORAL
Refills: 0 | DISCHARGE

## 2023-12-24 RX ORDER — ALLOPURINOL 300 MG
1 TABLET ORAL
Refills: 0 | DISCHARGE

## 2023-12-24 RX ORDER — SODIUM CHLORIDE 9 MG/ML
2200 INJECTION INTRAMUSCULAR; INTRAVENOUS; SUBCUTANEOUS ONCE
Refills: 0 | Status: COMPLETED | OUTPATIENT
Start: 2023-12-24 | End: 2023-12-24

## 2023-12-24 RX ORDER — ALLOPURINOL 300 MG
200 TABLET ORAL DAILY
Refills: 0 | Status: DISCONTINUED | OUTPATIENT
Start: 2023-12-24 | End: 2024-01-01

## 2023-12-24 RX ORDER — ACETAMINOPHEN 500 MG
500 TABLET ORAL EVERY 8 HOURS
Refills: 0 | Status: DISCONTINUED | OUTPATIENT
Start: 2023-12-24 | End: 2023-12-24

## 2023-12-24 RX ORDER — LAMOTRIGINE 25 MG/1
200 TABLET, ORALLY DISINTEGRATING ORAL AT BEDTIME
Refills: 0 | Status: DISCONTINUED | OUTPATIENT
Start: 2023-12-24 | End: 2024-01-01

## 2023-12-24 RX ORDER — AMLODIPINE BESYLATE 2.5 MG/1
1 TABLET ORAL
Refills: 0 | DISCHARGE

## 2023-12-24 RX ORDER — ASPIRIN/CALCIUM CARB/MAGNESIUM 324 MG
81 TABLET ORAL
Refills: 0 | Status: DISCONTINUED | OUTPATIENT
Start: 2023-12-24 | End: 2023-12-24

## 2023-12-24 RX ORDER — AMLODIPINE BESYLATE 2.5 MG/1
5 TABLET ORAL DAILY
Refills: 0 | Status: DISCONTINUED | OUTPATIENT
Start: 2023-12-24 | End: 2024-01-01

## 2023-12-24 RX ORDER — DEXTROAMPHETAMINE SACCHARATE, AMPHETAMINE ASPARTATE, DEXTROAMPHETAMINE SULFATE AND AMPHETAMINE SULFATE 1.875; 1.875; 1.875; 1.875 MG/1; MG/1; MG/1; MG/1
1 TABLET ORAL
Refills: 0 | DISCHARGE

## 2023-12-24 RX ORDER — OXYCODONE HYDROCHLORIDE 5 MG/1
5 TABLET ORAL EVERY 4 HOURS
Refills: 0 | Status: DISCONTINUED | OUTPATIENT
Start: 2023-12-24 | End: 2023-12-31

## 2023-12-24 RX ORDER — ACETAMINOPHEN 500 MG
1000 TABLET ORAL EVERY 8 HOURS
Refills: 0 | Status: DISCONTINUED | OUTPATIENT
Start: 2023-12-24 | End: 2024-01-01

## 2023-12-24 RX ORDER — ENOXAPARIN SODIUM 100 MG/ML
40 INJECTION SUBCUTANEOUS EVERY 24 HOURS
Refills: 0 | Status: DISCONTINUED | OUTPATIENT
Start: 2023-12-24 | End: 2024-01-01

## 2023-12-24 RX ORDER — CEFAZOLIN SODIUM 1 G
1000 VIAL (EA) INJECTION ONCE
Refills: 0 | Status: COMPLETED | OUTPATIENT
Start: 2023-12-24 | End: 2023-12-24

## 2023-12-24 RX ORDER — COLCHICINE 0.6 MG
0 TABLET ORAL
Refills: 0 | DISCHARGE

## 2023-12-24 RX ORDER — PANTOPRAZOLE SODIUM 20 MG/1
40 TABLET, DELAYED RELEASE ORAL
Refills: 0 | Status: DISCONTINUED | OUTPATIENT
Start: 2023-12-24 | End: 2024-01-01

## 2023-12-24 RX ORDER — CELECOXIB 200 MG/1
200 CAPSULE ORAL EVERY 12 HOURS
Refills: 0 | Status: DISCONTINUED | OUTPATIENT
Start: 2023-12-24 | End: 2024-01-01

## 2023-12-24 RX ORDER — HYDROMORPHONE HYDROCHLORIDE 2 MG/ML
0.5 INJECTION INTRAMUSCULAR; INTRAVENOUS; SUBCUTANEOUS ONCE
Refills: 0 | Status: DISCONTINUED | OUTPATIENT
Start: 2023-12-24 | End: 2023-12-24

## 2023-12-24 RX ORDER — FLUCONAZOLE 150 MG/1
150 TABLET ORAL ONCE
Refills: 0 | Status: COMPLETED | OUTPATIENT
Start: 2023-12-24 | End: 2023-12-24

## 2023-12-24 RX ORDER — VANCOMYCIN HCL 1 G
1750 VIAL (EA) INTRAVENOUS EVERY 12 HOURS
Refills: 0 | Status: DISCONTINUED | OUTPATIENT
Start: 2023-12-24 | End: 2023-12-25

## 2023-12-24 RX ADMIN — Medication 81 MILLIGRAM(S): at 16:07

## 2023-12-24 RX ADMIN — CELECOXIB 200 MILLIGRAM(S): 200 CAPSULE ORAL at 22:17

## 2023-12-24 RX ADMIN — Medication 1000 MILLIGRAM(S): at 13:15

## 2023-12-24 RX ADMIN — Medication 100 MILLIGRAM(S): at 12:36

## 2023-12-24 RX ADMIN — Medication 500 MILLIGRAM(S): at 16:18

## 2023-12-24 RX ADMIN — HYDROMORPHONE HYDROCHLORIDE 0.5 MILLIGRAM(S): 2 INJECTION INTRAMUSCULAR; INTRAVENOUS; SUBCUTANEOUS at 12:37

## 2023-12-24 RX ADMIN — OXYCODONE HYDROCHLORIDE 5 MILLIGRAM(S): 5 TABLET ORAL at 21:46

## 2023-12-24 RX ADMIN — Medication 200 MILLIGRAM(S): at 21:47

## 2023-12-24 RX ADMIN — Medication 500 MILLIGRAM(S): at 16:07

## 2023-12-24 RX ADMIN — HYDROMORPHONE HYDROCHLORIDE 0.5 MILLIGRAM(S): 2 INJECTION INTRAMUSCULAR; INTRAVENOUS; SUBCUTANEOUS at 10:13

## 2023-12-24 RX ADMIN — AMLODIPINE BESYLATE 5 MILLIGRAM(S): 2.5 TABLET ORAL at 21:46

## 2023-12-24 RX ADMIN — HYDROMORPHONE HYDROCHLORIDE 0.5 MILLIGRAM(S): 2 INJECTION INTRAMUSCULAR; INTRAVENOUS; SUBCUTANEOUS at 09:44

## 2023-12-24 RX ADMIN — SODIUM CHLORIDE 2200 MILLILITER(S): 9 INJECTION INTRAMUSCULAR; INTRAVENOUS; SUBCUTANEOUS at 11:00

## 2023-12-24 RX ADMIN — HYDROMORPHONE HYDROCHLORIDE 0.5 MILLIGRAM(S): 2 INJECTION INTRAMUSCULAR; INTRAVENOUS; SUBCUTANEOUS at 13:15

## 2023-12-24 RX ADMIN — ENOXAPARIN SODIUM 40 MILLIGRAM(S): 100 INJECTION SUBCUTANEOUS at 21:06

## 2023-12-24 RX ADMIN — SODIUM CHLORIDE 2200 MILLILITER(S): 9 INJECTION INTRAMUSCULAR; INTRAVENOUS; SUBCUTANEOUS at 09:44

## 2023-12-24 RX ADMIN — PANTOPRAZOLE SODIUM 40 MILLIGRAM(S): 20 TABLET, DELAYED RELEASE ORAL at 16:04

## 2023-12-24 RX ADMIN — OXYCODONE HYDROCHLORIDE 5 MILLIGRAM(S): 5 TABLET ORAL at 22:17

## 2023-12-24 RX ADMIN — FLUCONAZOLE 150 MILLIGRAM(S): 150 TABLET ORAL at 22:30

## 2023-12-24 RX ADMIN — CELECOXIB 200 MILLIGRAM(S): 200 CAPSULE ORAL at 21:47

## 2023-12-24 RX ADMIN — Medication 250 MILLIGRAM(S): at 21:07

## 2023-12-24 NOTE — PATIENT PROFILE ADULT - FALL HARM RISK - HARM RISK INTERVENTIONS
Assistance with ambulation/Assistance OOB with selected safe patient handling equipment/Communicate Risk of Fall with Harm to all staff/Discuss with provider need for PT consult/Monitor gait and stability/Provide patient with walking aids - walker, cane, crutches/Reinforce activity limits and safety measures with patient and family/Tailored Fall Risk Interventions/Visual Cue: Yellow wristband and red socks/Bed in lowest position, wheels locked, appropriate side rails in place/Call bell, personal items and telephone in reach/Instruct patient to call for assistance before getting out of bed or chair/Non-slip footwear when patient is out of bed/[place] to call system/Physically safe environment - no spills, clutter or unnecessary equipment/Purposeful Proactive Rounding/Room/bathroom lighting operational, light cord in reach Assistance with ambulation/Assistance OOB with selected safe patient handling equipment/Communicate Risk of Fall with Harm to all staff/Discuss with provider need for PT consult/Monitor gait and stability/Provide patient with walking aids - walker, cane, crutches/Reinforce activity limits and safety measures with patient and family/Tailored Fall Risk Interventions/Visual Cue: Yellow wristband and red socks/Bed in lowest position, wheels locked, appropriate side rails in place/Call bell, personal items and telephone in reach/Instruct patient to call for assistance before getting out of bed or chair/Non-slip footwear when patient is out of bed/[place] to call system/Physically safe environment - no spills, clutter or unnecessary equipment/Purposeful Proactive Rounding/Room/bathroom lighting operational, light cord in reach

## 2023-12-24 NOTE — ED ADULT NURSE NOTE - NSFALLRISKINTERV_ED_ALL_ED
Assistance OOB with selected safe patient handling equipment if applicable/Assistance with ambulation/Communicate fall risk and risk factors to all staff, patient, and family/Monitor gait and stability/Provide patient with walking aids/Provide visual cue: yellow wristband, yellow gown, etc/Reinforce activity limits and safety measures with patient and family/Call bell, personal items and telephone in reach/Instruct patient to call for assistance before getting out of bed/chair/stretcher/Non-slip footwear applied when patient is off stretcher/[place] to call system/Physically safe environment - no spills, clutter or unnecessary equipment/Purposeful Proactive Rounding/Room/bathroom lighting operational, light cord in reach Assistance OOB with selected safe patient handling equipment if applicable/Assistance with ambulation/Communicate fall risk and risk factors to all staff, patient, and family/Monitor gait and stability/Provide patient with walking aids/Provide visual cue: yellow wristband, yellow gown, etc/Reinforce activity limits and safety measures with patient and family/Call bell, personal items and telephone in reach/Instruct patient to call for assistance before getting out of bed/chair/stretcher/Non-slip footwear applied when patient is off stretcher/[place] to call system/Physically safe environment - no spills, clutter or unnecessary equipment/Purposeful Proactive Rounding/Room/bathroom lighting operational, light cord in reach

## 2023-12-24 NOTE — H&P ADULT - ASSESSMENT
63M HTN, GOUT, GIRISH, with recent Left TKR admitted for Left Lower Extremity Cellulitis     Left Lower Leg Cellulitis  US negative for DVT   IV Vancomycin given MRSA positive Screen on PST   Concern for seeding recent hardware   Consult ID and Orthopedics     Aftercare Following S/P Left TKR 12/18/2023  Pain control with Tylenol + Celebrex and Oxycodone  Aspirin BID for DVT Prophylaxis    HTN  Amlodipine    Gout   Allopurinol     Diet  Regular    DVT Prophylaxis  Aspirin BID    Disposition   Discharge planning pending hospital course    63M HTN, GOUT, GIRISH, with recent Left TKR admitted for Left Lower Extremity Cellulitis     Left Lower Leg Cellulitis  US negative for DVT   IV Vancomycin given MRSA positive Screen on PST   I suspect portal of entry most likely is related to patient Athletes Foot in both feet  Concern for seeding recent hardware   Consult ID and Orthopedics     Tinea Pedis  Will give one dose of Flucanazole oral   ID Consulted    Aftercare Following S/P Left TKR 12/18/2023  Pain control with Tylenol + Celebrex and Oxycodone  Aspirin BID for DVT Prophylaxis    HTN  Amlodipine    Gout   Allopurinol     Diet  Regular    DVT Prophylaxis  Aspirin BID    Disposition   Discharge planning pending hospital course

## 2023-12-24 NOTE — CONSULT NOTE ADULT - SUBJECTIVE AND OBJECTIVE BOX
63 M with PMHx of HTN, GOUT, ADHD, GIRISH, B/L knees OA, s/p Left TKR 12/18/2023 with Dr. Jasmine. Patients states he was discharge from the hospital POD1 and was ambulating without any issues, last two days he noted to have his calf begin to hurt and was getting progressively worse. Today his calf and foot was hurting severely and he wasn't able to put weight on the leg. He Denies any trauma, fall, fever chills, numbness, weakness.     Vital Signs Last 24 Hrs  T(C): 36.8 (24 Dec 2023 12:54), Max: 36.8 (24 Dec 2023 12:54)  T(F): 98.3 (24 Dec 2023 12:54), Max: 98.3 (24 Dec 2023 12:54)  HR: 66 (24 Dec 2023 12:54) (66 - 75)  BP: 168/92 (24 Dec 2023 12:54) (144/81 - 168/92)  BP(mean): --  RR: 16 (24 Dec 2023 12:54) (16 - 16)  SpO2: 94% (24 Dec 2023 12:54) (94% - 96%)    Parameters below as of 24 Dec 2023 12:54  Patient On (Oxygen Delivery Method): room air      PE: Left Lower extremity: + healing surgical incision, mepilex dressing removed, + prineo, minimal erythema, moderate knee swelling, ROM: 0-90 with pain. significant swelling, erythema noted distal to the knee joint extending to the foot/toes, + warmth, worse over the calf and shin, significant erythema over medial thigh and all toes/foot. DP +2,     Left Knee xray: consistent with normal post operative Total knee replacement, no fractures, no dislocations seen.     Left lower extremity doppler: negative for acute DVT      CBC Full  -  ( 24 Dec 2023 09:44 )  WBC Count : 11.00 K/uL  RBC Count : 4.50 M/uL  Hemoglobin : 13.0 g/dL  Hematocrit : 39.0 %  Platelet Count - Automated : 306 K/uL  Mean Cell Volume : 86.7 fl  Mean Cell Hemoglobin : 28.9 pg  Mean Cell Hemoglobin Concentration : 33.3 gm/dL  Auto Neutrophil # : 8.79 K/uL  Auto Lymphocyte # : 1.11 K/uL  Auto Monocyte # : 0.85 K/uL  Auto Eosinophil # : 0.17 K/uL  Auto Basophil # : 0.03 K/uL  Auto Neutrophil % : 79.9 %  Auto Lymphocyte % : 10.1 %  Auto Monocyte % : 7.7 %  Auto Eosinophil % : 1.5 %  Auto Basophil % : 0.3 %    12-24    139  |  100  |  24<H>  ----------------------------<  126<H>  4.4   |  28  |  0.81    Ca    9.3      24 Dec 2023 09:44    TPro  7.1  /  Alb  3.7  /  TBili  0.6  /  DBili  x   /  AST  26  /  ALT  34  /  AlkPhos  79  12-24      A/P: 62 yo M with Left lower extremity swelling, erythema, echymosis consistent with cellulitis  - stat IV antibiotics, MSSA + on priop nasal swab.   - WBAT  - pain control prn  - DVT prophylaxis, was discharge on ASA 81 bid, consider switching to Eliquis  - ID consult  - Hospitalist consulted  - case discussed with Dr. Jasmine  - Admit for IV antibiotics  - will continue to follow inpatient

## 2023-12-24 NOTE — H&P ADULT - NSHPLABSRESULTS_GEN_ALL_CORE
Labs:                          13.0   11.00 )-----------( 306      ( 24 Dec 2023 09:44 )             39.0       12-24    139  |  100  |  24<H>  ----------------------------<  126<H>  4.4   |  28  |  0.81    Ca    9.3      24 Dec 2023 09:44    TPro  7.1  /  Alb  3.7  /  TBili  0.6  /  DBili  x   /  AST  26  /  ALT  34  /  AlkPhos  79  12-24              Urinalysis Basic - ( 24 Dec 2023 09:44 )    Color: x / Appearance: x / SG: x / pH: x  Gluc: 126 mg/dL / Ketone: x  / Bili: x / Urobili: x   Blood: x / Protein: x / Nitrite: x   Leuk Esterase: x / RBC: x / WBC x   Sq Epi: x / Non Sq Epi: x / Bacteria: x        PT/INR - ( 24 Dec 2023 09:44 )   PT: 12.2 sec;   INR: 1.09 ratio         PTT - ( 24 Dec 2023 09:44 )  PTT:30.0 sec    Lactate Trend  12-24 @ 09:44 Lactate:1.3             CAPILLARY BLOOD GLUCOSE          EKG:   Personally Reviewed:  [ ] YES     Imaging:   Personally Reviewed:  [ ] YES

## 2023-12-24 NOTE — ED PROVIDER NOTE - PROGRESS NOTE DETAILS
colleen (ortho) paged d/w colleen requests ancef and admit to hospitalist. d/w ally (hospitalist) he will admit pt

## 2023-12-24 NOTE — ED PROVIDER NOTE - CLINICAL SUMMARY MEDICAL DECISION MAKING FREE TEXT BOX
Patient who had a left knee replacement on December 18 brought in by EMS for worsening pain edema and erythema to his left leg especially since yesterday.  Patient denies fevers chills chest pain shortness of breath.  Patient not on anticoagulants.  Orthopedist Mitali    Plan Labs left knee x-ray left lower extremity Doppler IV fluids Dilaudid orthopedic consult    Differential including but not limited to DVT cellulitis septic joint

## 2023-12-24 NOTE — H&P ADULT - NSHPPHYSICALEXAM_GEN_ALL_CORE
PHYSICAL EXAM:  Vital Signs Last 24 Hrs  T(C): 36.8 (24 Dec 2023 12:54), Max: 36.8 (24 Dec 2023 12:54)  T(F): 98.3 (24 Dec 2023 12:54), Max: 98.3 (24 Dec 2023 12:54)  HR: 66 (24 Dec 2023 12:54) (66 - 75)  BP: 168/92 (24 Dec 2023 12:54) (144/81 - 168/92)  BP(mean): --  RR: 16 (24 Dec 2023 12:54) (16 - 16)  SpO2: 94% (24 Dec 2023 12:54) (94% - 96%)    Parameters below as of 24 Dec 2023 12:54  Patient On (Oxygen Delivery Method): room air        GENERAL: NAD  HEAD:  Atraumatic, Normocephalic  ENMT: Rajan Mucous Membranes  NECK: Supple, No JVD, Normal thyroid  HEART: Regular rate and rhythm; No murmurs, rubs, or gallops  CHEST/LUNG: Clear to auscultation bilaterally; No rales, rhonchi, wheezing, or rubs  ABDOMEN: Soft, Nontender, Nondistended; Bowel sounds present  EXTREMITIES:  2+ Peripheral Pulses, No clubbing, cyanosis, or edema  SKIN: No rashes or lesions

## 2023-12-24 NOTE — ED ADULT TRIAGE NOTE - CHIEF COMPLAINT QUOTE
" I has a knee replacement here on  monday and now I have a lot of pain LLE with swelling and redness"

## 2023-12-24 NOTE — ED PROVIDER NOTE - OBJECTIVE STATEMENT
Patient who had a left knee replacement on December 18 brought in by EMS for worsening pain edema and erythema to his left leg especially since yesterday.  Patient denies fevers chills chest pain shortness of breath.  Patient not on anticoagulants.  Orthopedist Mitali

## 2023-12-24 NOTE — H&P ADULT - NSHPREVIEWOFSYSTEMS_GEN_ALL_CORE
REVIEW OF SYSTEMS:  CONSTITUTIONAL: No fever, weight loss, or fatigue  EYES: No eye pain, visual disturbances, or discharge  ENMT:  No difficulty hearing, tinnitus, vertigo; No sinus or throat pain  NECK: No pain or stiffness  BREASTS: No pain, masses, or nipple discharge  RESPIRATORY: No cough, wheezing, chills or hemoptysis; No shortness of breath  CARDIOVASCULAR: No chest pain, palpitations, dizziness, or leg swelling  GASTROINTESTINAL: No abdominal or epigastric pain. No nausea, vomiting, or hematemesis; No diarrhea or constipation. No melena or hematochezia.  GENITOURINARY: No dysuria, frequency, hematuria, or incontinence  NEUROLOGICAL: No headaches, memory loss, loss of strength, numbness, or tremors  SKIN: No itching, burning, rashes, or lesions   LYMPH NODES: No enlarged glands  ENDOCRINE: No heat or cold intolerance; No hair loss; No polydipsia or polyuria  MUSCULOSKELETAL: Lower Extremity Swelling, Pain and redness   PSYCHIATRIC: No depression, anxiety, mood swings, or difficulty sleeping  HEME/LYMPH: No easy bruising, or bleeding gums  ALLERGY AND IMMUNOLOGIC: No hives or eczema REVIEW OF SYSTEMS:  CONSTITUTIONAL: No fever, weight loss, or fatigue  EYES: No eye pain, visual disturbances, or discharge  ENMT:  No difficulty hearing, tinnitus, vertigo; No sinus or throat pain  NECK: No pain or stiffness  RESPIRATORY: No cough, wheezing, chills or hemoptysis; No shortness of breath  CARDIOVASCULAR: No chest pain, palpitations, dizziness, or leg swelling  GASTROINTESTINAL: No abdominal or epigastric pain. No nausea, vomiting, or hematemesis; No diarrhea or constipation. No melena or hematochezia.  GENITOURINARY: No dysuria, frequency, hematuria, or incontinence  NEUROLOGICAL: No headaches, memory loss, loss of strength, numbness, or tremors  SKIN: LLE with erythema and swelling extending from knee down to ankle.  Swollen and hot to touch.  Tender to palpation.   LYMPH NODES: No enlarged glands  ENDOCRINE: No heat or cold intolerance; No hair loss; No polydipsia or polyuria  MUSCULOSKELETAL: Lower Extremity Swelling, Pain and redness   PSYCHIATRIC: No depression, anxiety, mood swings, or difficulty sleeping  HEME/LYMPH: No easy bruising, or bleeding gums  ALLERGY AND IMMUNOLOGIC: No hives or eczema

## 2023-12-24 NOTE — ED ADULT NURSE NOTE - OBJECTIVE STATEMENT
Pt BIBEMS from home for worsening pain , swelling and redness of the LLE since his left knee surgery ( 12/18/2023) No fever or chills. Pt reported pain is worse and unable to stand up. Pt presented with wound dressing on the left knee  - (+) Pain , redness and generalized swelling of the LLE (+) Weak left pedal pulses

## 2023-12-24 NOTE — H&P ADULT - HISTORY OF PRESENT ILLNESS
63 M with PMHx of HTN, GOUT, ADHD, GIRISH, B/L knees OA, s/p Left TKR 12/18/2023 with Dr. Jasmine. Patients states he was discharge from the hospital POD1 and was ambulating without any issues, last two days he noted to have his calf begin to hurt and was getting progressively worse. Today his calf and foot was hurting severely and he wasn't able to put weight on the leg. He Denies any trauma, fall, fever chills, numbness, weakness.

## 2023-12-24 NOTE — ED PROVIDER NOTE - MUSCULOSKELETAL, MLM
LLE ecchymosis to posterior thigh, knee surg bandage in place, lower leg with edema, erythema warmth and tenderness

## 2023-12-25 LAB
ALBUMIN SERPL ELPH-MCNC: 3.4 G/DL — SIGNIFICANT CHANGE UP (ref 3.3–5)
ALBUMIN SERPL ELPH-MCNC: 3.4 G/DL — SIGNIFICANT CHANGE UP (ref 3.3–5)
ALP SERPL-CCNC: 70 U/L — SIGNIFICANT CHANGE UP (ref 30–120)
ALP SERPL-CCNC: 70 U/L — SIGNIFICANT CHANGE UP (ref 30–120)
ALT FLD-CCNC: 29 U/L — SIGNIFICANT CHANGE UP (ref 10–60)
ALT FLD-CCNC: 29 U/L — SIGNIFICANT CHANGE UP (ref 10–60)
ANION GAP SERPL CALC-SCNC: 8 MMOL/L — SIGNIFICANT CHANGE UP (ref 5–17)
ANION GAP SERPL CALC-SCNC: 8 MMOL/L — SIGNIFICANT CHANGE UP (ref 5–17)
AST SERPL-CCNC: 19 U/L — SIGNIFICANT CHANGE UP (ref 10–40)
AST SERPL-CCNC: 19 U/L — SIGNIFICANT CHANGE UP (ref 10–40)
BASOPHILS # BLD AUTO: 0.03 K/UL — SIGNIFICANT CHANGE UP (ref 0–0.2)
BASOPHILS # BLD AUTO: 0.03 K/UL — SIGNIFICANT CHANGE UP (ref 0–0.2)
BASOPHILS NFR BLD AUTO: 0.3 % — SIGNIFICANT CHANGE UP (ref 0–2)
BASOPHILS NFR BLD AUTO: 0.3 % — SIGNIFICANT CHANGE UP (ref 0–2)
BILIRUB SERPL-MCNC: 0.7 MG/DL — SIGNIFICANT CHANGE UP (ref 0.2–1.2)
BILIRUB SERPL-MCNC: 0.7 MG/DL — SIGNIFICANT CHANGE UP (ref 0.2–1.2)
BUN SERPL-MCNC: 20 MG/DL — SIGNIFICANT CHANGE UP (ref 7–23)
BUN SERPL-MCNC: 20 MG/DL — SIGNIFICANT CHANGE UP (ref 7–23)
CALCIUM SERPL-MCNC: 9.3 MG/DL — SIGNIFICANT CHANGE UP (ref 8.4–10.5)
CALCIUM SERPL-MCNC: 9.3 MG/DL — SIGNIFICANT CHANGE UP (ref 8.4–10.5)
CHLORIDE SERPL-SCNC: 103 MMOL/L — SIGNIFICANT CHANGE UP (ref 96–108)
CHLORIDE SERPL-SCNC: 103 MMOL/L — SIGNIFICANT CHANGE UP (ref 96–108)
CO2 SERPL-SCNC: 28 MMOL/L — SIGNIFICANT CHANGE UP (ref 22–31)
CO2 SERPL-SCNC: 28 MMOL/L — SIGNIFICANT CHANGE UP (ref 22–31)
CREAT SERPL-MCNC: 0.98 MG/DL — SIGNIFICANT CHANGE UP (ref 0.5–1.3)
CREAT SERPL-MCNC: 0.98 MG/DL — SIGNIFICANT CHANGE UP (ref 0.5–1.3)
EGFR: 87 ML/MIN/1.73M2 — SIGNIFICANT CHANGE UP
EGFR: 87 ML/MIN/1.73M2 — SIGNIFICANT CHANGE UP
EOSINOPHIL # BLD AUTO: 0.31 K/UL — SIGNIFICANT CHANGE UP (ref 0–0.5)
EOSINOPHIL # BLD AUTO: 0.31 K/UL — SIGNIFICANT CHANGE UP (ref 0–0.5)
EOSINOPHIL NFR BLD AUTO: 3.6 % — SIGNIFICANT CHANGE UP (ref 0–6)
EOSINOPHIL NFR BLD AUTO: 3.6 % — SIGNIFICANT CHANGE UP (ref 0–6)
GLUCOSE SERPL-MCNC: 120 MG/DL — HIGH (ref 70–99)
GLUCOSE SERPL-MCNC: 120 MG/DL — HIGH (ref 70–99)
HCT VFR BLD CALC: 37.2 % — LOW (ref 39–50)
HCT VFR BLD CALC: 37.2 % — LOW (ref 39–50)
HCV AB S/CO SERPL IA: 0.33 S/CO — SIGNIFICANT CHANGE UP (ref 0–0.99)
HCV AB S/CO SERPL IA: 0.33 S/CO — SIGNIFICANT CHANGE UP (ref 0–0.99)
HCV AB SERPL-IMP: SIGNIFICANT CHANGE UP
HCV AB SERPL-IMP: SIGNIFICANT CHANGE UP
HGB BLD-MCNC: 12.4 G/DL — LOW (ref 13–17)
HGB BLD-MCNC: 12.4 G/DL — LOW (ref 13–17)
IMM GRANULOCYTES NFR BLD AUTO: 0.8 % — SIGNIFICANT CHANGE UP (ref 0–0.9)
IMM GRANULOCYTES NFR BLD AUTO: 0.8 % — SIGNIFICANT CHANGE UP (ref 0–0.9)
LYMPHOCYTES # BLD AUTO: 1.71 K/UL — SIGNIFICANT CHANGE UP (ref 1–3.3)
LYMPHOCYTES # BLD AUTO: 1.71 K/UL — SIGNIFICANT CHANGE UP (ref 1–3.3)
LYMPHOCYTES # BLD AUTO: 19.9 % — SIGNIFICANT CHANGE UP (ref 13–44)
LYMPHOCYTES # BLD AUTO: 19.9 % — SIGNIFICANT CHANGE UP (ref 13–44)
MCHC RBC-ENTMCNC: 28.8 PG — SIGNIFICANT CHANGE UP (ref 27–34)
MCHC RBC-ENTMCNC: 28.8 PG — SIGNIFICANT CHANGE UP (ref 27–34)
MCHC RBC-ENTMCNC: 33.3 GM/DL — SIGNIFICANT CHANGE UP (ref 32–36)
MCHC RBC-ENTMCNC: 33.3 GM/DL — SIGNIFICANT CHANGE UP (ref 32–36)
MCV RBC AUTO: 86.5 FL — SIGNIFICANT CHANGE UP (ref 80–100)
MCV RBC AUTO: 86.5 FL — SIGNIFICANT CHANGE UP (ref 80–100)
MONOCYTES # BLD AUTO: 1.13 K/UL — HIGH (ref 0–0.9)
MONOCYTES # BLD AUTO: 1.13 K/UL — HIGH (ref 0–0.9)
MONOCYTES NFR BLD AUTO: 13.1 % — SIGNIFICANT CHANGE UP (ref 2–14)
MONOCYTES NFR BLD AUTO: 13.1 % — SIGNIFICANT CHANGE UP (ref 2–14)
NEUTROPHILS # BLD AUTO: 5.35 K/UL — SIGNIFICANT CHANGE UP (ref 1.8–7.4)
NEUTROPHILS # BLD AUTO: 5.35 K/UL — SIGNIFICANT CHANGE UP (ref 1.8–7.4)
NEUTROPHILS NFR BLD AUTO: 62.3 % — SIGNIFICANT CHANGE UP (ref 43–77)
NEUTROPHILS NFR BLD AUTO: 62.3 % — SIGNIFICANT CHANGE UP (ref 43–77)
NRBC # BLD: 0 /100 WBCS — SIGNIFICANT CHANGE UP (ref 0–0)
NRBC # BLD: 0 /100 WBCS — SIGNIFICANT CHANGE UP (ref 0–0)
PLATELET # BLD AUTO: 283 K/UL — SIGNIFICANT CHANGE UP (ref 150–400)
PLATELET # BLD AUTO: 283 K/UL — SIGNIFICANT CHANGE UP (ref 150–400)
POTASSIUM SERPL-MCNC: 3.9 MMOL/L — SIGNIFICANT CHANGE UP (ref 3.5–5.3)
POTASSIUM SERPL-MCNC: 3.9 MMOL/L — SIGNIFICANT CHANGE UP (ref 3.5–5.3)
POTASSIUM SERPL-SCNC: 3.9 MMOL/L — SIGNIFICANT CHANGE UP (ref 3.5–5.3)
POTASSIUM SERPL-SCNC: 3.9 MMOL/L — SIGNIFICANT CHANGE UP (ref 3.5–5.3)
PROT SERPL-MCNC: 6.8 G/DL — SIGNIFICANT CHANGE UP (ref 6–8.3)
PROT SERPL-MCNC: 6.8 G/DL — SIGNIFICANT CHANGE UP (ref 6–8.3)
RBC # BLD: 4.3 M/UL — SIGNIFICANT CHANGE UP (ref 4.2–5.8)
RBC # BLD: 4.3 M/UL — SIGNIFICANT CHANGE UP (ref 4.2–5.8)
RBC # FLD: 12.6 % — SIGNIFICANT CHANGE UP (ref 10.3–14.5)
RBC # FLD: 12.6 % — SIGNIFICANT CHANGE UP (ref 10.3–14.5)
SODIUM SERPL-SCNC: 139 MMOL/L — SIGNIFICANT CHANGE UP (ref 135–145)
SODIUM SERPL-SCNC: 139 MMOL/L — SIGNIFICANT CHANGE UP (ref 135–145)
WBC # BLD: 8.6 K/UL — SIGNIFICANT CHANGE UP (ref 3.8–10.5)
WBC # BLD: 8.6 K/UL — SIGNIFICANT CHANGE UP (ref 3.8–10.5)
WBC # FLD AUTO: 8.6 K/UL — SIGNIFICANT CHANGE UP (ref 3.8–10.5)
WBC # FLD AUTO: 8.6 K/UL — SIGNIFICANT CHANGE UP (ref 3.8–10.5)

## 2023-12-25 PROCEDURE — 99233 SBSQ HOSP IP/OBS HIGH 50: CPT

## 2023-12-25 RX ORDER — HYDROMORPHONE HYDROCHLORIDE 2 MG/ML
0.5 INJECTION INTRAMUSCULAR; INTRAVENOUS; SUBCUTANEOUS
Refills: 0 | Status: DISCONTINUED | OUTPATIENT
Start: 2023-12-25 | End: 2023-12-25

## 2023-12-25 RX ORDER — CEFTRIAXONE 500 MG/1
1000 INJECTION, POWDER, FOR SOLUTION INTRAMUSCULAR; INTRAVENOUS EVERY 24 HOURS
Refills: 0 | Status: DISCONTINUED | OUTPATIENT
Start: 2023-12-25 | End: 2023-12-26

## 2023-12-25 RX ADMIN — HYDROMORPHONE HYDROCHLORIDE 0.5 MILLIGRAM(S): 2 INJECTION INTRAMUSCULAR; INTRAVENOUS; SUBCUTANEOUS at 00:52

## 2023-12-25 RX ADMIN — ENOXAPARIN SODIUM 40 MILLIGRAM(S): 100 INJECTION SUBCUTANEOUS at 21:31

## 2023-12-25 RX ADMIN — Medication 200 MILLIGRAM(S): at 12:06

## 2023-12-25 RX ADMIN — CELECOXIB 200 MILLIGRAM(S): 200 CAPSULE ORAL at 21:44

## 2023-12-25 RX ADMIN — CEFTRIAXONE 100 MILLIGRAM(S): 500 INJECTION, POWDER, FOR SOLUTION INTRAMUSCULAR; INTRAVENOUS at 10:25

## 2023-12-25 RX ADMIN — LAMOTRIGINE 200 MILLIGRAM(S): 25 TABLET, ORALLY DISINTEGRATING ORAL at 21:33

## 2023-12-25 RX ADMIN — OXYCODONE HYDROCHLORIDE 5 MILLIGRAM(S): 5 TABLET ORAL at 23:56

## 2023-12-25 RX ADMIN — LAMOTRIGINE 200 MILLIGRAM(S): 25 TABLET, ORALLY DISINTEGRATING ORAL at 00:46

## 2023-12-25 RX ADMIN — Medication 1000 MILLIGRAM(S): at 21:30

## 2023-12-25 RX ADMIN — CELECOXIB 200 MILLIGRAM(S): 200 CAPSULE ORAL at 10:11

## 2023-12-25 RX ADMIN — HYDROMORPHONE HYDROCHLORIDE 0.5 MILLIGRAM(S): 2 INJECTION INTRAMUSCULAR; INTRAVENOUS; SUBCUTANEOUS at 01:20

## 2023-12-25 RX ADMIN — Medication 1000 MILLIGRAM(S): at 06:15

## 2023-12-25 RX ADMIN — CELECOXIB 200 MILLIGRAM(S): 200 CAPSULE ORAL at 09:41

## 2023-12-25 RX ADMIN — AMLODIPINE BESYLATE 5 MILLIGRAM(S): 2.5 TABLET ORAL at 09:41

## 2023-12-25 RX ADMIN — Medication 250 MILLIGRAM(S): at 06:16

## 2023-12-25 RX ADMIN — PANTOPRAZOLE SODIUM 40 MILLIGRAM(S): 20 TABLET, DELAYED RELEASE ORAL at 06:15

## 2023-12-25 RX ADMIN — Medication 1000 MILLIGRAM(S): at 14:58

## 2023-12-25 RX ADMIN — Medication 1000 MILLIGRAM(S): at 14:07

## 2023-12-25 NOTE — PATIENT CHOICE NOTE. - NSPTCHOICESTATE_GEN_ALL_CORE
I have met with the patient and/or caregiver to discuss discharge goals and treatment plan. Patient and/or caregiver also provided with instructions on accessing the CMS Compare websites for additional information related to Post Acute Provider quality and resource use measures to assist them in evaluation of the providers and in selecting their post-acute provider of choice. Patient and caregiver were informed of the facilities that are owned and/or operated by [facility]. I have discussed with the patient the availability of in-network facilities and providers. Patient and caregiver provided with a list of post-acute providers whose services are appropriate to the discharge plans and patient needs.     For patient requiring durable medical equipment, patient and/or caregiver were informed that they have the right to request who provides the required equipment. I have met with the patient and/or caregiver to discuss discharge goals and treatment plan. Patient and/or caregiver also provided with instructions on accessing the CMS Compare websites for additional information related to Post Acute Provider quality and resource use measures to assist them in evaluation of the providers and in selecting their post-acute provider of choice. Patient and caregiver were informed of the facilities that are owned and/or operated by [facility]. I have discussed with the patient the availability of in-network facilities and providers. Patient and caregiver provided with a list of post-acute providers whose services are appropriate to the discharge plans and patient needs.     For patient requiring durable medical equipment, patient and/or caregiver were informed that they have the right to request who provides the required equipment.

## 2023-12-25 NOTE — PROGRESS NOTE ADULT - SUBJECTIVE AND OBJECTIVE BOX
admitted 12/24  S/P: Left TKR on 12/18 and readmitted for LLE redness and swelling.                   SUBJECTIVE: Patient seen and examined.  Resting in Bed in ER. Patient states he is comfortable and that his condition has improved. ID (Dr. Jaramillo) was present at the beginning of exam.   Reported Pain Score =0/10 at rest and 5/10 with movement/WB today. This is much improved from yesterday which was a 5/10 at rest and an 8/10 with movement/WB. Antibiotics are working.   Denies: CP/SOB/N/V/Numbness/Tingling    OBJECTIVE:     Vital Signs Last 24 Hrs  T(C): 36.3 (25 Dec 2023 08:44), Max: 36.8 (24 Dec 2023 12:54)  T(F): 97.4 (25 Dec 2023 08:44), Max: 98.3 (24 Dec 2023 12:54)  HR: 58 (25 Dec 2023 08:44) (58 - 87)  BP: 149/75 (25 Dec 2023 08:44) (144/85 - 168/92)  RR: 18 (25 Dec 2023 08:44) (16 - 19)  SpO2: 96% (25 Dec 2023 08:44) (94% - 96%)    Parameters below as of 25 Dec 2023 08:44  Patient On (Oxygen Delivery Method): room air      Gen: AAOx3, NAD, resting in bed. pleasant affect  Abd: soft, NT, ND  Left Knee:         Healing Surgical Incision with  Prineo Dressing: clean/dry/intact  with no discharge noted, Knee is moderately swollen but not erythematous around incision. Erythema appear localized to Anterior Shin below Tibial Tuberosity and continues through LLE into foot/toes. Left Lower Extermity appears less red than yesterday which patient also confirms. Anterior Skin on shin is warm to touch, knee is not warm. ROM of knee to 90 degrees. Good ROM and movement.   Bilateral LEs:         Sensation:  intact to light touch          Motor exam:  5/5 dorsiflexion/plantarflexion/EHL          LLE toes are ecchymotic with some erythema ans swelling.skin is dry. Patient states he has recurrent fungal infections too. CR<2s         2+ DP pulses          calf supple, NT- patient tolerated full exam without issue. When patient hung leg off stretcher or stood up, pain increased, but decreased once back in bed.      LABS:                        12.4   8.60  )-----------( 283      ( 25 Dec 2023 06:00 )             37.2     12-25    139  |  103  |  20  ----------------------------<  120<H>  3.9   |  28  |  0.98    Ca    9.3      25 Dec 2023 06:00    TPro  6.8  /  Alb  3.4  /  TBili  0.7  /  DBili  x   /  AST  19  /  ALT  29  /  AlkPhos  70  12-25    PT/INR - ( 24 Dec 2023 09:44 )   PT: 12.2 sec;   INR: 1.09 ratio         PTT - ( 24 Dec 2023 09:44 )  PTT:30.0 sec  Urinalysis Basic - ( 25 Dec 2023 06:00 )    Color: x / Appearance: x / SG: x / pH: x  Gluc: 120 mg/dL / Ketone: x  / Bili: x / Urobili: x   Blood: x / Protein: x / Nitrite: x   Leuk Esterase: x / RBC: x / WBC x   Sq Epi: x / Non Sq Epi: x / Bacteria: x        MEDICATIONS:  Anticoagulation:  enoxaparin Injectable 40 milliGRAM(s) SubCutaneous every 24 hours      Pain medications:   acetaminophen     Tablet .. 1000 milliGRAM(s) Oral every 8 hours  celecoxib 200 milliGRAM(s) Oral every 12 hours  HYDROmorphone  Injectable 0.5 milliGRAM(s) IV Push every 3 hours PRN  oxyCODONE    IR 5 milliGRAM(s) Oral every 4 hours PRN        A/P :63y Male  s/p LLE swelling after  Left TKR on 12/18, admitted 12/24  -    Pain control: Acetaminophen, Celebrex, Oxycodone, Dilaudid   -    Cryotherapy and elevation of operative extremity to help with pain and swelling.   -    DVT ppx:      Lovenox 40 SQ daily  and ambulation as tolerated. Consider Eliquis for DVT upon D/C.   -    Medicine: primary management  -    ID consult- Dr. Jaramillo- recommended short course of IV Ceftriaxone then transition to PO antibiotic therapy since LLE erythema/swelling appears to be nonpurulent cellulitis.    -    Weight bearing status: WBAT , OOBTC  -    Physical Therapy- OOBTC  -    Occupational Therapy  -    Discharge plan:   pending ID recommendations and discharge per Primary service/Medicine. Orthopedically stable at this time.     admitted 12/24  S/P: Left TKR on 12/18 and readmitted for LLE redness and swelling.                   SUBJECTIVE: Patient seen and examined.  Resting in Bed in ER. Patient states he is comfortable and that his condition has improved. ID (Dr. Jaramillo) was present at the beginning of exam.   Reported Pain Score =0/10 at rest and 5/10 with movement/WB today. This is much improved from yesterday which was a 5/10 at rest and an 8/10 with movement/WB. Antibiotics are working.   Denies: CP/SOB/N/V/Numbness/Tingling    OBJECTIVE:     Vital Signs Last 24 Hrs  T(C): 36.3 (25 Dec 2023 08:44), Max: 36.8 (24 Dec 2023 12:54)  T(F): 97.4 (25 Dec 2023 08:44), Max: 98.3 (24 Dec 2023 12:54)  HR: 58 (25 Dec 2023 08:44) (58 - 87)  BP: 149/75 (25 Dec 2023 08:44) (144/85 - 168/92)  RR: 18 (25 Dec 2023 08:44) (16 - 19)  SpO2: 96% (25 Dec 2023 08:44) (94% - 96%)    Parameters below as of 25 Dec 2023 08:44  Patient On (Oxygen Delivery Method): room air      Gen: AAOx3, NAD, resting in bed. pleasant affect  Abd: soft, NT, ND  Left Knee:         Healing Surgical Incision with  Prineo Dressing: clean/dry/intact  with no discharge noted, Knee is moderately swollen but not erythematous around incision. Erythema appear localized to Anterior Shin below Tibial Tuberosity and continues through LLE into foot/toes. Left Lower Extermity appears less red than yesterday which patient also confirms. Anterior Skin on shin is warm to touch, knee is not warm. ROM of knee to 90 degrees. Good ROM and movement.   Bilateral LEs:         Sensation:  intact to light touch          Motor exam:  5/5 dorsiflexion/plantarflexion/EHL          LLE toes are ecchymotic with some erythema ans swelling.skin is dry. Patient states he has recurrent fungal infections too. CR<2s         2+ DP pulses          calf supple, NT- patient tolerated full exam without issue. When patient hung leg off stretcher or stood up, pain increased, but decreased once back in bed.      LABS:                        12.4   8.60  )-----------( 283      ( 25 Dec 2023 06:00 )             37.2     12-25    139  |  103  |  20  ----------------------------<  120<H>  3.9   |  28  |  0.98    Ca    9.3      25 Dec 2023 06:00    TPro  6.8  /  Alb  3.4  /  TBili  0.7  /  DBili  x   /  AST  19  /  ALT  29  /  AlkPhos  70  12-25    PT/INR - ( 24 Dec 2023 09:44 )   PT: 12.2 sec;   INR: 1.09 ratio         PTT - ( 24 Dec 2023 09:44 )  PTT:30.0 sec  Urinalysis Basic - ( 25 Dec 2023 06:00 )    Color: x / Appearance: x / SG: x / pH: x  Gluc: 120 mg/dL / Ketone: x  / Bili: x / Urobili: x   Blood: x / Protein: x / Nitrite: x   Leuk Esterase: x / RBC: x / WBC x   Sq Epi: x / Non Sq Epi: x / Bacteria: x        MEDICATIONS:  Anticoagulation:  enoxaparin Injectable 40 milliGRAM(s) SubCutaneous every 24 hours      Pain medications:   acetaminophen     Tablet .. 1000 milliGRAM(s) Oral every 8 hours  celecoxib 200 milliGRAM(s) Oral every 12 hours  HYDROmorphone  Injectable 0.5 milliGRAM(s) IV Push every 3 hours PRN  oxyCODONE    IR 5 milliGRAM(s) Oral every 4 hours PRN        A/P :63y Male  s/p LLE swelling after  Left TKR on 12/18, admitted 12/24  -    Pain control: Acetaminophen, Celebrex, Oxycodone, Dilaudid   -    Cryotherapy and elevation of operative extremity to help with pain and swelling.   -    DVT ppx:      Lovenox 40 SQ daily  and ambulation as tolerated. Consider Eliquis for DVT upon D/C.   -    Medicine: primary management  -    LLE doppler- negative for DVT  -    ID consult- Dr. Jaramillo- recommended short course of IV Ceftriaxone then transition to PO antibiotic therapy since LLE erythema/swelling appears to be nonpurulent cellulitis.    -    Weight bearing status: WBAT   -    Physical Therapy - ok if tolerating pain and TKR protocol  -    Occupational Therapy  -    Discharge plan:   pending ID recommendations and discharge per Primary service/Medicine. Orthopedically stable at this time.

## 2023-12-25 NOTE — CONSULT NOTE ADULT - ASSESSMENT
63 M with PMHx of HTN, GOUT, ADHD, GIRISH, B/L knees OA, s/p Left TKR 12/18/2023 with Dr. Jasmine. Patients states he was discharge from the hospital POD1 and was ambulating without any issues, last two days PTA he noted to have his calf begin to hurt and was getting progressively worse.     RECOMMENDATIONS  consistent with nonpurulent cellulitis and knee does not appear to be involved so rec  Ceftriaxone 1 gram IV daily but as pt improves potential to finish course with   Cefuroxime 500mg PO BID with total duration of 5 days so last day 12/29 but recs to follow    Thank you for consulting us and involving us in the management of this most interesting and challenging case.  We will follow along in the care of this patient. Please call us at [phone] or text me directly on my cell# at [phone] with any concerns.   63 M with PMHx of HTN, GOUT, ADHD, GIRISH, B/L knees OA, s/p Left TKR 12/18/2023 with Dr. Jasmine. Patients states he was discharge from the hospital POD1 and was ambulating without any issues, last two days PTA he noted to have his calf begin to hurt and was getting progressively worse.     RECOMMENDATIONS  consistent with nonpurulent cellulitis and knee does not appear to be involved so rec  Ceftriaxone 1 gram IV daily but as pt improves potential to finish course with   Cefuroxime 500mg PO BID with total duration of 5 days so last day 12/29 but recs to follow    Thank you for consulting us and involving us in the management of this most interesting and challenging case.  We will follow along in the care of this patient. Please call us at [phone] or text me directly on my cell# at [phone] with any concerns.

## 2023-12-25 NOTE — CARE COORDINATION ASSESSMENT. - NSCAREPROVIDERS_GEN_ALL_CORE_FT
CARE PROVIDERS:  Accepting Physician: Beatriz Reed  Administration: Ryan Ramos  Admitting: Beatriz Reed  Attending: Beatriz Reed  Case Management: Santaromana, Anna  Consultant: Joseph Kenney  Consultant: Kunal Jasmine  Consultant: Nader Jaramillo  ED Attending: Levar Hickman  ED Nurse: Buck Troy  Nurse: Buck Troy  Nurse: Adrian Feliciano  Nurse: Maureen Bates  Ordered: ServiceAccount, SCMMLM  PCA/Nursing Assistant: Gisell Downey  Physical Therapy: Ashanti Rivera  Primary Team: Crystal Bermudez  Primary Team: Ariana Otto  : Erasmo Orosco  : Michelle Tillman  Team: [facility], Team

## 2023-12-25 NOTE — CAREGIVER ENGAGEMENT NOTE - CAREGIVER OUTREACH NOTES - FREE TEXT
TRANSITION OF CARE PLAN  DC to home ; resume home care services via [facility]; No DME needs; spouse to assume care; will f/u with MD post DC family will transport patient home.  NO DME needs;  TRANSITION OF CARE PLAN  DC to home ; resume home care services via [facility]; No DME needs; spouse to assume care; will f/u with MD post DC family will transport patient home.  NO DME needs;

## 2023-12-25 NOTE — PROGRESS NOTE ADULT - ASSESSMENT
63M HTN, GOUT, GIRISH, with recent Left TKR admitted for Left Lower Extremity Cellulitis     Left Lower Leg Cellulitis  US negative for DVT   IV Vancomycin given MRSA positive Screen on PST   I suspect portal of entry most likely is related to patient Athletes Foot in both feet  One dose of Oral Fluconazole given   Concern for seeding recent hardware   Consult ID and Orthopedics     Tinea Pedis  Will give one dose of Flucanazole oral   ID Consulted    Aftercare Following S/P Left TKR 12/18/2023  Pain control with Tylenol + Celebrex and Oxycodone  Aspirin BID for DVT Prophylaxis    HTN  Amlodipine    Gout   Allopurinol     Diet  Regular    DVT Prophylaxis  Lovenox    Disposition   Discharge planning pending hospital course

## 2023-12-25 NOTE — PHYSICAL THERAPY INITIAL EVALUATION ADULT - RANGE OF MOTION EXAMINATION, REHAB EVAL
left knee flexion~95 deg supine/bilateral upper extremity ROM was WNL (within normal limits)/bilateral lower extremity ROM was WFL (within functional limits)

## 2023-12-25 NOTE — CONSULT NOTE ADULT - SUBJECTIVE AND OBJECTIVE BOX
[facility]FECTIOUS DISEASE  Nader Jaramillo MD PhD, Gladys Hogan MD, Fabby Zacarias MD, Mars Milner MD, Wayne Sargent MD  and providing coverage with Saumya Rivas MD  Providing Infectious Disease Consultations at [facility], [facility], [facility], [facility]    Office# [phone] to schedule follow up appointments  Answering Service for urgent calls or New Consults [phone]  Cell# to text for urgent issues Nader Jaramillo [phone]     HPI:  63 M with PMHx of HTN, GOUT, ADHD, GIRISH, B/L knees OA, s/p Left TKR 12/18/2023 with Dr. Jasmine. Patients states he was discharge from the hospital POD1 and was ambulating without any issues, last two days PTA he noted to have his calf begin to hurt and was getting progressively worse. His calf and foot was hurting severely and he wasn't able to put weight on the leg. He Denies any trauma, fall, fever chills, numbness, weakness.  (24 Dec 2023 15:35)      PAST MEDICAL & SURGICAL HISTORY:  Hypertension      Adult ADHD      Personal history of gout      OA (osteoarthritis) of knee      GIRISH on CPAP      History of Lyme disease      History of right bundle branch block (RBBB)      S/P arthroscopy of left shoulder      S/P arthroscopy of right shoulder      H/O arthroscopy of knee          Antimicrobials  vancomycin  IVPB 1750 milliGRAM(s) IV Intermittent every 12 hours      Immunological      Other  acetaminophen     Tablet .. 1000 milliGRAM(s) Oral every 8 hours  allopurinol 200 milliGRAM(s) Oral daily  amLODIPine   Tablet 5 milliGRAM(s) Oral daily  celecoxib 200 milliGRAM(s) Oral every 12 hours  enoxaparin Injectable 40 milliGRAM(s) SubCutaneous every 24 hours  HYDROmorphone  Injectable 0.5 milliGRAM(s) IV Push every 3 hours PRN  lamoTRIgine 200 milliGRAM(s) Oral at bedtime  oxyCODONE    IR 5 milliGRAM(s) Oral every 4 hours PRN  pantoprazole    Tablet 40 milliGRAM(s) Oral before breakfast      Allergies    No Known Allergies    Intolerances        SOCIAL HISTORY:  Social History:      FAMILY HISTORY:  FH: heart disease (Father, Mother)        ROS:    EYES:  Negative  blurry vision or double vision  GASTROINTESTINAL:  Negative for nausea, vomiting, diarrhea  -otherwise negative except for subjective    Vital Signs Last 24 Hrs  T(C): 36.3 (25 Dec 2023 08:44), Max: 36.8 (24 Dec 2023 12:54)  T(F): 97.4 (25 Dec 2023 08:44), Max: 98.3 (24 Dec 2023 12:54)  HR: 58 (25 Dec 2023 08:44) (58 - 87)  BP: 149/75 (25 Dec 2023 08:44) (144/85 - 168/92)  BP(mean): --  RR: 18 (25 Dec 2023 08:44) (16 - 19)  SpO2: 96% (25 Dec 2023 08:44) (94% - 96%)    Parameters below as of 25 Dec 2023 08:44  Patient On (Oxygen Delivery Method): room air        PE:  In no distress  HEENT:  NC, PERRL, sclerae anicteric, conjunctivae clear, EOMI.  Sinuses nontender, no nasal exudate.  No buccal or pharyngeal lesions, erythema or exudate  Neck:  Supple, no adenopathy  Lungs:  No accessory muscle use, bilaterally clear to auscultation  Cor:  distant  Abd:  Symmetric, normoactive BS.  Soft, nontender, no masses, guarding or rebound.  Liver and spleen not enlarged  Extrem:  No cyanosis, LLE with erythema below knee, swelling , increased warmth, knee not involved with good ROM  Neuro: grossly intact  Musc: moving all limbs freely, no focal deficits        LABS:                        12.4   8.60  )-----------( 283      ( 25 Dec 2023 06:00 )             37.2       WBC Count: 8.60 K/uL (12-25-23 @ 06:00)  WBC Count: 11.00 K/uL (12-24-23 @ 09:44)  WBC Count: 17.37 K/uL (12-19-23 @ 07:49)      12-25    139  |  103  |  20  ----------------------------<  120<H>  3.9   |  28  |  0.98    Ca    9.3      25 Dec 2023 06:00    TPro  6.8  /  Alb  3.4  /  TBili  0.7  /  DBili  x   /  AST  19  /  ALT  29  /  AlkPhos  70  12-25      Creatinine: 0.98 mg/dL (12-25-23 @ 06:00)  Creatinine: 0.81 mg/dL (12-24-23 @ 09:44)  Creatinine: 1.02 mg/dL (12-19-23 @ 07:49)      Urinalysis Basic - ( 25 Dec 2023 06:00 )    Color: x / Appearance: x / SG: x / pH: x  Gluc: 120 mg/dL / Ketone: x  / Bili: x / Urobili: x   Blood: x / Protein: x / Nitrite: x   Leuk Esterase: x / RBC: x / WBC x   Sq Epi: x / Non Sq Epi: x / Bacteria: x              MICROBIOLOGY:      RADIOLOGY & ADDITIONAL STUDIES:    -- [facility]FECT[facility]OUS DISEASE  Nader Jaramillo MD PhD, Gladys Hogan MD, Fabby Zacarias MD, Mars Milner MD, Wayne Sargent MD  and providing coverage with Saumya Rivas MD  Providing Infectious Disease Consultations at [facility], [facility], [facility], [facility]'s    Office# [phone] to schedule follow up appointments  Answering Service for urgent calls or New Consults [phone]  Cell# to text for urgent issues Nader Jaramillo [phone]     HPI:  63 M with PMHx of HTN, GOUT, ADHD, GIRISH, B/L knees OA, s/p Left TKR 12/18/2023 with Dr. Jasmine. Patients states he was discharge from the hospital POD1 and was ambulating without any issues, last two days PTA he noted to have his calf begin to hurt and was getting progressively worse. His calf and foot was hurting severely and he wasn't able to put weight on the leg. He Denies any trauma, fall, fever chills, numbness, weakness.  (24 Dec 2023 15:35)      PAST MEDICAL & SURGICAL HISTORY:  Hypertension      Adult ADHD      Personal history of gout      OA (osteoarthritis) of knee      GIRISH on CPAP      History of Lyme disease      History of right bundle branch block (RBBB)      S/P arthroscopy of left shoulder      S/P arthroscopy of right shoulder      H/O arthroscopy of knee          Antimicrobials  vancomycin  IVPB 1750 milliGRAM(s) IV Intermittent every 12 hours      Immunological      Other  acetaminophen     Tablet .. 1000 milliGRAM(s) Oral every 8 hours  allopurinol 200 milliGRAM(s) Oral daily  amLODIPine   Tablet 5 milliGRAM(s) Oral daily  celecoxib 200 milliGRAM(s) Oral every 12 hours  enoxaparin Injectable 40 milliGRAM(s) SubCutaneous every 24 hours  HYDROmorphone  Injectable 0.5 milliGRAM(s) IV Push every 3 hours PRN  lamoTRIgine 200 milliGRAM(s) Oral at bedtime  oxyCODONE    IR 5 milliGRAM(s) Oral every 4 hours PRN  pantoprazole    Tablet 40 milliGRAM(s) Oral before breakfast      Allergies    No Known Allergies    Intolerances        SOCIAL HISTORY:  Social History:      FAMILY HISTORY:  FH: heart disease (Father, Mother)        ROS:    EYES:  Negative  blurry vision or double vision  GASTROINTESTINAL:  Negative for nausea, vomiting, diarrhea  -otherwise negative except for subjective    Vital Signs Last 24 Hrs  T(C): 36.3 (25 Dec 2023 08:44), Max: 36.8 (24 Dec 2023 12:54)  T(F): 97.4 (25 Dec 2023 08:44), Max: 98.3 (24 Dec 2023 12:54)  HR: 58 (25 Dec 2023 08:44) (58 - 87)  BP: 149/75 (25 Dec 2023 08:44) (144/85 - 168/92)  BP(mean): --  RR: 18 (25 Dec 2023 08:44) (16 - 19)  SpO2: 96% (25 Dec 2023 08:44) (94% - 96%)    Parameters below as of 25 Dec 2023 08:44  Patient On (Oxygen Delivery Method): room air        PE:  In no distress  HEENT:  NC, PERRL, sclerae anicteric, conjunctivae clear, EOMI.  Sinuses nontender, no nasal exudate.  No buccal or pharyngeal lesions, erythema or exudate  Neck:  Supple, no adenopathy  Lungs:  No accessory muscle use, bilaterally clear to auscultation  Cor:  distant  Abd:  Symmetric, normoactive BS.  Soft, nontender, no masses, guarding or rebound.  Liver and spleen not enlarged  Extrem:  No cyanosis, LLE with erythema below knee, swelling , increased warmth, knee not involved with good ROM  Neuro: grossly intact  Musc: moving all limbs freely, no focal deficits        LABS:                        12.4   8.60  )-----------( 283      ( 25 Dec 2023 06:00 )             37.2       WBC Count: 8.60 K/uL (12-25-23 @ 06:00)  WBC Count: 11.00 K/uL (12-24-23 @ 09:44)  WBC Count: 17.37 K/uL (12-19-23 @ 07:49)      12-25    139  |  103  |  20  ----------------------------<  120<H>  3.9   |  28  |  0.98    Ca    9.3      25 Dec 2023 06:00    TPro  6.8  /  Alb  3.4  /  TBili  0.7  /  DBili  x   /  AST  19  /  ALT  29  /  AlkPhos  70  12-25      Creatinine: 0.98 mg/dL (12-25-23 @ 06:00)  Creatinine: 0.81 mg/dL (12-24-23 @ 09:44)  Creatinine: 1.02 mg/dL (12-19-23 @ 07:49)      Urinalysis Basic - ( 25 Dec 2023 06:00 )    Color: x / Appearance: x / SG: x / pH: x  Gluc: 120 mg/dL / Ketone: x  / Bili: x / Urobili: x   Blood: x / Protein: x / Nitrite: x   Leuk Esterase: x / RBC: x / WBC x   Sq Epi: x / Non Sq Epi: x / Bacteria: x              MICROBIOLOGY:      RADIOLOGY & ADDITIONAL STUDIES:    --

## 2023-12-25 NOTE — PROGRESS NOTE ADULT - SUBJECTIVE AND OBJECTIVE BOX
Subjective: Patient seen and examined. No overnight events.  Afebrile.     MEDICATIONS  (STANDING):  acetaminophen     Tablet .. 1000 milliGRAM(s) Oral every 8 hours  allopurinol 200 milliGRAM(s) Oral daily  amLODIPine   Tablet 5 milliGRAM(s) Oral daily  celecoxib 200 milliGRAM(s) Oral every 12 hours  enoxaparin Injectable 40 milliGRAM(s) SubCutaneous every 24 hours  lamoTRIgine 200 milliGRAM(s) Oral at bedtime  pantoprazole    Tablet 40 milliGRAM(s) Oral before breakfast  vancomycin  IVPB 1750 milliGRAM(s) IV Intermittent every 12 hours    MEDICATIONS  (PRN):  HYDROmorphone  Injectable 0.5 milliGRAM(s) IV Push every 3 hours PRN Breakthrough pain  oxyCODONE    IR 5 milliGRAM(s) Oral every 4 hours PRN for moderate pain      Allergies    No Known Allergies    Intolerances        Vital Signs Last 24 Hrs  T(C): 36.4 (25 Dec 2023 05:42), Max: 36.8 (24 Dec 2023 12:54)  T(F): 97.6 (25 Dec 2023 05:42), Max: 98.3 (24 Dec 2023 12:54)  HR: 64 (25 Dec 2023 05:42) (64 - 87)  BP: 144/85 (25 Dec 2023 05:42) (144/81 - 168/92)  BP(mean): --  RR: 19 (25 Dec 2023 05:42) (16 - 19)  SpO2: 95% (25 Dec 2023 05:42) (94% - 96%)    Parameters below as of 24 Dec 2023 21:40  Patient On (Oxygen Delivery Method): room air        PHYSICAL EXAM:  GENERAL: NAD, well-groomed, well-developed  HEAD:  Atraumatic, Normocephalic  ENMT: Moist mucous membranes,   NECK: Supple, No JVD, Normal thyroid  NERVOUS SYSTEM:  All 4 extremities mobile, no gross sensory deficits.   CHEST/LUNG: Clear to auscultation bilaterally; No rales, rhonchi, wheezing, or rubs  HEART: Regular rate and rhythm; No murmurs, rubs, or gallops  ABDOMEN: Soft, Nontender, Nondistended; Bowel sounds present  EXTREMITIES:  LLE Erythema swollen and Tender      LABS:                        12.4   8.60  )-----------( 283      ( 25 Dec 2023 06:00 )             37.2     25 Dec 2023 06:00    139    |  103    |  20     ----------------------------<  120    3.9     |  28     |  0.98     Ca    9.3        25 Dec 2023 06:00    TPro  6.8    /  Alb  3.4    /  TBili  0.7    /  DBili  x      /  AST  19     /  ALT  29     /  AlkPhos  70     25 Dec 2023 06:00    PT/INR - ( 24 Dec 2023 09:44 )   PT: 12.2 sec;   INR: 1.09 ratio         PTT - ( 24 Dec 2023 09:44 )  PTT:30.0 sec  Urinalysis Basic - ( 25 Dec 2023 06:00 )    Color: x / Appearance: x / SG: x / pH: x  Gluc: 120 mg/dL / Ketone: x  / Bili: x / Urobili: x   Blood: x / Protein: x / Nitrite: x   Leuk Esterase: x / RBC: x / WBC x   Sq Epi: x / Non Sq Epi: x / Bacteria: x      CAPILLARY BLOOD GLUCOSE          RADIOLOGY & ADDITIONAL TESTS:    Imaging Personally Reviewed:  [ ] YES     Consultant(s) Notes Reviewed:      Care Discussed with Consultants/Other Providers:    Advanced Directives: [ ] DNR  [ ] No feeding tube  [ ] MOLST in chart  [ ] MOLST completed today  [ ] Unknown

## 2023-12-25 NOTE — PATIENT CHOICE NOTE. - NSPTCHOICENOTES_GEN_ALL_CORE
TRANSITION OF CARE PLAN  DC to home ; resume home care services via [facility]; No DME needs; spouse to assume care; will f/u with MD post DC family will transport patient home.  NO DME needs;  TRANSITION OF CARE PLAN  DC to home ; resume home care services via [facility]; No DME needs; spouse to assume care; will f/u with MD post DC family will transport patient home.  NO DME needs;

## 2023-12-25 NOTE — CARE COORDINATION ASSESSMENT. - OTHER PERTINENT DISCHARGE PLANNING INFORMATION:
RN TANIA met with pt. at bedside, pt. is A&O x4; able self direct own medical decisions; pt. states; he is post s/p TKR L TKR; admitted for infection;   Patient is receiving [facility] at home ; Lives with spouse in pvt home;  2STE to enter; 12STE to 2nd fl; Patient requesting to resume [facility] services when medically cleared for DC; Patient has RW; [employer] in the home;   TRANSITION OF CARE PLAN  DC to home ; resume home care services via [facility]; No DME needs; spouse to assume care; will f/u with MD post DC family will transport patient home.  NO DME needs;

## 2023-12-26 LAB
ANION GAP SERPL CALC-SCNC: 6 MMOL/L — SIGNIFICANT CHANGE UP (ref 5–17)
ANION GAP SERPL CALC-SCNC: 6 MMOL/L — SIGNIFICANT CHANGE UP (ref 5–17)
BUN SERPL-MCNC: 19 MG/DL — SIGNIFICANT CHANGE UP (ref 7–23)
BUN SERPL-MCNC: 19 MG/DL — SIGNIFICANT CHANGE UP (ref 7–23)
CALCIUM SERPL-MCNC: 9.4 MG/DL — SIGNIFICANT CHANGE UP (ref 8.4–10.5)
CALCIUM SERPL-MCNC: 9.4 MG/DL — SIGNIFICANT CHANGE UP (ref 8.4–10.5)
CHLORIDE SERPL-SCNC: 102 MMOL/L — SIGNIFICANT CHANGE UP (ref 96–108)
CHLORIDE SERPL-SCNC: 102 MMOL/L — SIGNIFICANT CHANGE UP (ref 96–108)
CO2 SERPL-SCNC: 31 MMOL/L — SIGNIFICANT CHANGE UP (ref 22–31)
CO2 SERPL-SCNC: 31 MMOL/L — SIGNIFICANT CHANGE UP (ref 22–31)
CREAT SERPL-MCNC: 1.1 MG/DL — SIGNIFICANT CHANGE UP (ref 0.5–1.3)
CREAT SERPL-MCNC: 1.1 MG/DL — SIGNIFICANT CHANGE UP (ref 0.5–1.3)
EGFR: 76 ML/MIN/1.73M2 — SIGNIFICANT CHANGE UP
EGFR: 76 ML/MIN/1.73M2 — SIGNIFICANT CHANGE UP
GLUCOSE SERPL-MCNC: 144 MG/DL — HIGH (ref 70–99)
GLUCOSE SERPL-MCNC: 144 MG/DL — HIGH (ref 70–99)
HCT VFR BLD CALC: 37.9 % — LOW (ref 39–50)
HCT VFR BLD CALC: 37.9 % — LOW (ref 39–50)
HGB BLD-MCNC: 12.8 G/DL — LOW (ref 13–17)
HGB BLD-MCNC: 12.8 G/DL — LOW (ref 13–17)
MCHC RBC-ENTMCNC: 29.2 PG — SIGNIFICANT CHANGE UP (ref 27–34)
MCHC RBC-ENTMCNC: 29.2 PG — SIGNIFICANT CHANGE UP (ref 27–34)
MCHC RBC-ENTMCNC: 33.8 GM/DL — SIGNIFICANT CHANGE UP (ref 32–36)
MCHC RBC-ENTMCNC: 33.8 GM/DL — SIGNIFICANT CHANGE UP (ref 32–36)
MCV RBC AUTO: 86.5 FL — SIGNIFICANT CHANGE UP (ref 80–100)
MCV RBC AUTO: 86.5 FL — SIGNIFICANT CHANGE UP (ref 80–100)
NRBC # BLD: 0 /100 WBCS — SIGNIFICANT CHANGE UP (ref 0–0)
NRBC # BLD: 0 /100 WBCS — SIGNIFICANT CHANGE UP (ref 0–0)
PLATELET # BLD AUTO: 296 K/UL — SIGNIFICANT CHANGE UP (ref 150–400)
PLATELET # BLD AUTO: 296 K/UL — SIGNIFICANT CHANGE UP (ref 150–400)
POTASSIUM SERPL-MCNC: 4.1 MMOL/L — SIGNIFICANT CHANGE UP (ref 3.5–5.3)
POTASSIUM SERPL-MCNC: 4.1 MMOL/L — SIGNIFICANT CHANGE UP (ref 3.5–5.3)
POTASSIUM SERPL-SCNC: 4.1 MMOL/L — SIGNIFICANT CHANGE UP (ref 3.5–5.3)
POTASSIUM SERPL-SCNC: 4.1 MMOL/L — SIGNIFICANT CHANGE UP (ref 3.5–5.3)
RBC # BLD: 4.38 M/UL — SIGNIFICANT CHANGE UP (ref 4.2–5.8)
RBC # BLD: 4.38 M/UL — SIGNIFICANT CHANGE UP (ref 4.2–5.8)
RBC # FLD: 12.4 % — SIGNIFICANT CHANGE UP (ref 10.3–14.5)
RBC # FLD: 12.4 % — SIGNIFICANT CHANGE UP (ref 10.3–14.5)
SODIUM SERPL-SCNC: 139 MMOL/L — SIGNIFICANT CHANGE UP (ref 135–145)
SODIUM SERPL-SCNC: 139 MMOL/L — SIGNIFICANT CHANGE UP (ref 135–145)
WBC # BLD: 11.37 K/UL — HIGH (ref 3.8–10.5)
WBC # BLD: 11.37 K/UL — HIGH (ref 3.8–10.5)
WBC # FLD AUTO: 11.37 K/UL — HIGH (ref 3.8–10.5)
WBC # FLD AUTO: 11.37 K/UL — HIGH (ref 3.8–10.5)

## 2023-12-26 PROCEDURE — 99233 SBSQ HOSP IP/OBS HIGH 50: CPT

## 2023-12-26 PROCEDURE — 93971 EXTREMITY STUDY: CPT | Mod: 26,LT

## 2023-12-26 RX ORDER — CEFAZOLIN SODIUM 1 G
VIAL (EA) INJECTION
Refills: 0 | Status: DISCONTINUED | OUTPATIENT
Start: 2023-12-26 | End: 2024-01-01

## 2023-12-26 RX ORDER — CEFAZOLIN SODIUM 1 G
2000 VIAL (EA) INJECTION ONCE
Refills: 0 | Status: COMPLETED | OUTPATIENT
Start: 2023-12-26 | End: 2023-12-26

## 2023-12-26 RX ORDER — CEFAZOLIN SODIUM 1 G
2000 VIAL (EA) INJECTION EVERY 8 HOURS
Refills: 0 | Status: DISCONTINUED | OUTPATIENT
Start: 2023-12-26 | End: 2024-01-01

## 2023-12-26 RX ORDER — VANCOMYCIN HCL 1 G
VIAL (EA) INTRAVENOUS
Refills: 0 | Status: DISCONTINUED | OUTPATIENT
Start: 2023-12-26 | End: 2023-12-26

## 2023-12-26 RX ADMIN — CELECOXIB 200 MILLIGRAM(S): 200 CAPSULE ORAL at 08:47

## 2023-12-26 RX ADMIN — OXYCODONE HYDROCHLORIDE 5 MILLIGRAM(S): 5 TABLET ORAL at 23:53

## 2023-12-26 RX ADMIN — Medication 1000 MILLIGRAM(S): at 21:11

## 2023-12-26 RX ADMIN — Medication 1000 MILLIGRAM(S): at 05:44

## 2023-12-26 RX ADMIN — Medication 200 MILLIGRAM(S): at 11:38

## 2023-12-26 RX ADMIN — CELECOXIB 200 MILLIGRAM(S): 200 CAPSULE ORAL at 09:16

## 2023-12-26 RX ADMIN — CELECOXIB 200 MILLIGRAM(S): 200 CAPSULE ORAL at 21:11

## 2023-12-26 RX ADMIN — AMLODIPINE BESYLATE 5 MILLIGRAM(S): 2.5 TABLET ORAL at 05:43

## 2023-12-26 RX ADMIN — Medication 1000 MILLIGRAM(S): at 13:10

## 2023-12-26 RX ADMIN — ENOXAPARIN SODIUM 40 MILLIGRAM(S): 100 INJECTION SUBCUTANEOUS at 21:11

## 2023-12-26 RX ADMIN — LAMOTRIGINE 200 MILLIGRAM(S): 25 TABLET, ORALLY DISINTEGRATING ORAL at 21:11

## 2023-12-26 RX ADMIN — CEFTRIAXONE 100 MILLIGRAM(S): 500 INJECTION, POWDER, FOR SOLUTION INTRAMUSCULAR; INTRAVENOUS at 08:48

## 2023-12-26 RX ADMIN — Medication 100 MILLIGRAM(S): at 11:38

## 2023-12-26 RX ADMIN — Medication 100 MILLIGRAM(S): at 21:11

## 2023-12-26 RX ADMIN — Medication 1000 MILLIGRAM(S): at 14:06

## 2023-12-26 RX ADMIN — OXYCODONE HYDROCHLORIDE 5 MILLIGRAM(S): 5 TABLET ORAL at 01:12

## 2023-12-26 NOTE — PROGRESS NOTE ADULT - ASSESSMENT
63 M with PMHx of HTN, GOUT, ADHD, GIRISH, B/L knees OA, s/p Left TKR 12/18/2023 with Dr. Jasmine. Patients states he was discharge from the hospital POD1 and was ambulating without any issues, last two days PTA he noted to have his calf begin to hurt and was getting progressively worse.     RECOMMENDATIONS  consistent with nonpurulent cellulitis and knee does not appear to be involved   appreciate ortho recs  MRSA swab neg, but positive S. aureus  BCx NGTD  GPC likely causative organisms  Switching to cefazolin for more GPC targeted therapy  Monitor for improvement  Further recs to follow    D/w Dr. Patel  Infectious Diseases will continue to follow. Please call with any questions.   Fabby Zacarias M.D.  [facility] Division of Infectious Diseases [phone]  For after 5 P.M. and weekends, please call [phone]     63 M with PMHx of HTN, GOUT, ADHD, GIRISH, B/L knees OA, s/p Left TKR 12/18/2023 with Dr. Jasmine. Patients states he was discharge from the hospital POD1 and was ambulating without any issues, last two days PTA he noted to have his calf begin to hurt and was getting progressively worse.     RECOMMENDATIONS  consistent with nonpurulent cellulitis and knee does not appear to be involved   appreciate ortho recs  MRSA swab neg, but positive S. aureus  BCx NGTD  GPC likely causative organisms  Switching to cefazolin for more GPC targeted therapy  Monitor for improvement  Further recs to follow    D/w Dr. Patel  Infectious Diseases will continue to follow. Please call with any questions.   Fabby Zacarias M.D.  [facility] Division of Infectious Diseases [phone]  For after 5 P.M. and weekends, please call [phone]

## 2023-12-26 NOTE — CASE MANAGEMENT PROGRESS NOTE - NSCMPROGRESSNOTE_GEN_ALL_CORE
Patient cont on IV Ancef, not medically cleared for DC. CASE MANAGEMENT will cont to be available for any needs.

## 2023-12-26 NOTE — PROGRESS NOTE ADULT - SUBJECTIVE AND OBJECTIVE BOX
Left TKR 12/18, discharged home POD 1, had 2 days incr pain, swelling,redness left lower leg  S: Pt without complaints. No SOB,CP, N/V. Tolerated Diet well.   Pain comfortable  on  Interval Rx. However when he tries to sit up and dangle left leg it increases pain.  Using oxycodone rarely  No BM yet, + flatus, No abdominal pain.  Pain Rx:  acetaminophen     Tablet .. 1000 milliGRAM(s) Oral every 8 hours  celecoxib 200 milliGRAM(s) Oral every 12 hours  HYDROmorphone  Injectable 0.5 milliGRAM(s) IV Push every 3 hours PRN  lamoTRIgine 200 milliGRAM(s) Oral at bedtime  oxyCODONE    IR 5 milliGRAM(s) Oral every 4 hours PRN    O: General: On exam, No Apparent Distress  Vital Signs Last 24 Hrs  T(C): 36.6 (26 Dec 2023 05:09), Max: 37.2 (25 Dec 2023 21:10)  T(F): 97.8 (26 Dec 2023 05:09), Max: 99 (25 Dec 2023 21:10)  HR: 70 (26 Dec 2023 05:09) (58 - 78)  BP: 146/78 (26 Dec 2023 05:09) (133/78 - 153/78)  BP(mean): --  RR: 18 (26 Dec 2023 05:09) (16 - 18)  SpO2: 96% (26 Dec 2023 05:09) (94% - 97%)    Parameters below as of 26 Dec 2023 05:09  Patient On (Oxygen Delivery Method): room air        Lungs: BS clear bilat.  Heart: RRR no murmur  Abdomen: + BS, soft , benign exam     Ext -- left knee slightly swollen, upper posterior thigh w/ ecchymosis, lower leg w/ erythema 1+ pitting edema and tenderness post calf to palpation  ROM: 0-115  Neurologic:  Has sensation over feet & toes bilat. Full AROM bilat feet & toes. EHL/AT = 5/5  Vascular: Feet toes warm, pink. DP = 2+. No calf tenderness right, + calf tenderness left.  - leah's  VTEP: On Bilat. Venodynes + enoxaparin Injectable 40 milliGRAM(s) SubCutaneous every 24 hours    I&O's Detail    25 Dec 2023 07:01  -  26 Dec 2023 07:00  --------------------------------------------------------  IN:  Total IN: 0 mL    OUT:    Voided (mL): 700 mL  Total OUT: 700 mL    Total NET: -700 mL          Activity in PT yesterday :Walked 2 steps, limited by pain  Labs yesterday noted.  Hospitalist input noted.                        12.4   8.60  )-----------( 283      ( 25 Dec 2023 06:00 )             37.2       12-25    139  |  103  |  20  ----------------------------<  120<H>  3.9   |  28  |  0.98    Ca    9.3      25 Dec 2023 06:00    TPro  6.8  /  Alb  3.4  /  TBili  0.7  /  DBili  x   /  AST  19  /  ALT  29  /  AlkPhos  70  12-25      Primary Orthopedic Assessment:  • Stable from Orthopedic perspective  • Neuro motor exam stable  • Labs: CBC / Chem stable      Plan:   • Continue:  PT/OT/WBAT with assistance of a walker/Ice to knee/ Knee ROM         Incentive spirometry encouraged   • Continue DVT prophylaxis as prescribed--lovenox 40 daily  • Continue Pain Rx  • Plans per Medicine   • Discharge planning – anticipated discharge is Home D/C with home care & home PT  when medically stable & cleared by PT/OT  --will repeat doppler to be sure there is no new dvt as pt has been essentially on bedrest due to pain.

## 2023-12-26 NOTE — PROGRESS NOTE ADULT - ASSESSMENT
63M HTN, GOUT, GIRISH, with recent Left TKR admitted for Left Lower Extremity Cellulitis     Left Lower Leg Cellulitis  US negative for DVT   IV Ceftriaxone Daily and will most likely switch to oral in 24 hours for discharge   I suspect portal of entry most likely is related to patient Athletes Foot in both feet  One dose of Oral Fluconazole given   Concern for seeding recent hardware   Consult ID and Orthopedics     Tinea Pedis  Will give one dose of Flucanazole oral   ID Consulted    Aftercare Following S/P Left TKR 12/18/2023  Pain control with Tylenol + Celebrex and Oxycodone  Aspirin BID for DVT Prophylaxis    HTN  Amlodipine    Gout   Allopurinol     Diet  Regular    DVT Prophylaxis  Lovenox    Disposition   Discharge planning pending hospital course

## 2023-12-26 NOTE — PROGRESS NOTE ADULT - SUBJECTIVE AND OBJECTIVE BOX
Subjective: Patient seen and examined. No overnight events.  Redness improving.     MEDICATIONS  (STANDING):  acetaminophen     Tablet .. 1000 milliGRAM(s) Oral every 8 hours  allopurinol 200 milliGRAM(s) Oral daily  amLODIPine   Tablet 5 milliGRAM(s) Oral daily  cefTRIAXone   IVPB 1000 milliGRAM(s) IV Intermittent every 24 hours  celecoxib 200 milliGRAM(s) Oral every 12 hours  enoxaparin Injectable 40 milliGRAM(s) SubCutaneous every 24 hours  lamoTRIgine 200 milliGRAM(s) Oral at bedtime  pantoprazole    Tablet 40 milliGRAM(s) Oral before breakfast    MEDICATIONS  (PRN):  HYDROmorphone  Injectable 0.5 milliGRAM(s) IV Push every 3 hours PRN Breakthrough pain  oxyCODONE    IR 5 milliGRAM(s) Oral every 4 hours PRN for moderate pain      Allergies    No Known Allergies    Intolerances        Vital Signs Last 24 Hrs  T(C): 36.6 (26 Dec 2023 05:09), Max: 37.2 (25 Dec 2023 21:10)  T(F): 97.8 (26 Dec 2023 05:09), Max: 99 (25 Dec 2023 21:10)  HR: 70 (26 Dec 2023 05:09) (58 - 78)  BP: 146/78 (26 Dec 2023 05:09) (133/78 - 153/78)  BP(mean): --  RR: 18 (26 Dec 2023 05:09) (16 - 18)  SpO2: 96% (26 Dec 2023 05:09) (94% - 97%)    Parameters below as of 26 Dec 2023 05:09  Patient On (Oxygen Delivery Method): room air        PHYSICAL EXAM:  GENERAL: NAD, well-groomed, well-developed  HEAD:  Atraumatic, Normocephalic  ENMT: Moist mucous membranes,   NECK: Supple, No JVD, Normal thyroid  NERVOUS SYSTEM:  All 4 extremities mobile, no gross sensory deficits.   CHEST/LUNG: Clear to auscultation bilaterally; No rales, rhonchi, wheezing, or rubs  HEART: Regular rate and rhythm; No murmurs, rubs, or gallops  ABDOMEN: Soft, Nontender, Nondistended; Bowel sounds present  EXTREMITIES:  LLE Erythema but improved from admission.  Tender to touch.       LABS:      Ca    9.3        25 Dec 2023 06:00      PT/INR - ( 24 Dec 2023 09:44 )   PT: 12.2 sec;   INR: 1.09 ratio         PTT - ( 24 Dec 2023 09:44 )  PTT:30.0 sec  Urinalysis Basic - ( 25 Dec 2023 06:00 )    Color: x / Appearance: x / SG: x / pH: x  Gluc: 120 mg/dL / Ketone: x  / Bili: x / Urobili: x   Blood: x / Protein: x / Nitrite: x   Leuk Esterase: x / RBC: x / WBC x   Sq Epi: x / Non Sq Epi: x / Bacteria: x      CAPILLARY BLOOD GLUCOSE          RADIOLOGY & ADDITIONAL TESTS:    Imaging Personally Reviewed:  [ ] YES     Consultant(s) Notes Reviewed:      Care Discussed with Consultants/Other Providers:    Advanced Directives: [ ] DNR  [ ] No feeding tube  [ ] MOLST in chart  [ ] MOLST completed today  [ ] Unknown

## 2023-12-26 NOTE — PROGRESS NOTE ADULT - SUBJECTIVE AND OBJECTIVE BOX
[facility], Division of Infectious Diseases  ROBERTO CARLOS Louise Y. Patel, S. Shah, G. [facility]  [phone]    Name: ELIZABETH BAEZ  Age: 63y  Gender: Male  MRN: [id number]    Interval History:  Patient seen and examined at bedside   No acute overnight events. Afebrile  No complaints  Notes reviewed    Antibiotics:  ceFAZolin   IVPB      ceFAZolin   IVPB 2000 milliGRAM(s) IV Intermittent every 8 hours      Medications:  acetaminophen     Tablet .. 1000 milliGRAM(s) Oral every 8 hours  allopurinol 200 milliGRAM(s) Oral daily  amLODIPine   Tablet 5 milliGRAM(s) Oral daily  ceFAZolin   IVPB      ceFAZolin   IVPB 2000 milliGRAM(s) IV Intermittent every 8 hours  celecoxib 200 milliGRAM(s) Oral every 12 hours  enoxaparin Injectable 40 milliGRAM(s) SubCutaneous every 24 hours  HYDROmorphone  Injectable 0.5 milliGRAM(s) IV Push every 3 hours PRN  lamoTRIgine 200 milliGRAM(s) Oral at bedtime  oxyCODONE    IR 5 milliGRAM(s) Oral every 4 hours PRN  pantoprazole    Tablet 40 milliGRAM(s) Oral before breakfast      Review of Systems:  A 10-point review of systems was obtained.   Review of systems otherwise negative except as previously noted.    Allergies: No Known Allergies    For details regarding the patient's past medical history, social history, family history, and other miscellaneous elements, please refer the initial infectious diseases consultation and/or the admitting history and physical examination for this admission.    Objective:  Vitals:   T(C): 36.6 (12-26-23 @ 05:09), Max: 37.2 (12-25-23 @ 21:10)  HR: 70 (12-26-23 @ 05:09) (65 - 78)  BP: 146/78 (12-26-23 @ 05:09) (133/78 - 153/78)  RR: 18 (12-26-23 @ 05:09) (16 - 18)  SpO2: 96% (12-26-23 @ 05:09) (94% - 97%)    Physical Examination:  General: no acute distress  HEENT: NC/AT, EOMI,   Cardio: S1, S2 heard, RRR, no murmurs  Resp: decreased b/l breath sounds  Abd: soft, NT, ND  Ext: S/p R knee replacement, incision c/d/i, R leg cellulitis improving but still w/ erythema and swelling  Skin: warm, dry, no visible rash      Laboratory Studies:  CBC:                       12.8   11.37 )-----------( 296      ( 26 Dec 2023 06:00 )             37.9     CMP: 12-26    139  |  102  |  19  ----------------------------<  144<H>  4.1   |  31  |  1.10    Ca    9.4      26 Dec 2023 06:00    TPro  6.8  /  Alb  3.4  /  TBili  0.7  /  DBili  x   /  AST  19  /  ALT  29  /  AlkPhos  70  12-25    LIVER FUNCTIONS - ( 25 Dec 2023 06:00 )  Alb: 3.4 g/dL / Pro: 6.8 g/dL / ALK PHOS: 70 U/L / ALT: 29 U/L / AST: 19 U/L / GGT: x           Urinalysis Basic - ( 26 Dec 2023 06:00 )    Color: x / Appearance: x / SG: x / pH: x  Gluc: 144 mg/dL / Ketone: x  / Bili: x / Urobili: x   Blood: x / Protein: x / Nitrite: x   Leuk Esterase: x / RBC: x / WBC x   Sq Epi: x / Non Sq Epi: x / Bacteria: x        Microbiology: reviewed    Culture - Blood (collected 12-24-23 @ 09:40)  Source: .Blood Blood-Peripheral  Preliminary Report (12-25-23 @ 14:02):    No growth at 24 hours    Culture - Blood (collected 12-24-23 @ 09:40)  Source: .Blood Blood-Peripheral  Preliminary Report (12-25-23 @ 14:02):    No growth at 24 hours          Radiology: reviewed       [facility] [place], Division of Infectious Diseases  ROBERTO CARLOS Louise Y. Patel, S. Shah, G. [facility]  [phone]    Name: ELIZABETH BAEZ  Age: 63y  Gender: Male  MRN: [id number]    Interval History:  Patient seen and examined at bedside   No acute overnight events. Afebrile  No complaints  Notes reviewed    Antibiotics:  ceFAZolin   IVPB      ceFAZolin   IVPB 2000 milliGRAM(s) IV Intermittent every 8 hours      Medications:  acetaminophen     Tablet .. 1000 milliGRAM(s) Oral every 8 hours  allopurinol 200 milliGRAM(s) Oral daily  amLODIPine   Tablet 5 milliGRAM(s) Oral daily  ceFAZolin   IVPB      ceFAZolin   IVPB 2000 milliGRAM(s) IV Intermittent every 8 hours  celecoxib 200 milliGRAM(s) Oral every 12 hours  enoxaparin Injectable 40 milliGRAM(s) SubCutaneous every 24 hours  HYDROmorphone  Injectable 0.5 milliGRAM(s) IV Push every 3 hours PRN  lamoTRIgine 200 milliGRAM(s) Oral at bedtime  oxyCODONE    IR 5 milliGRAM(s) Oral every 4 hours PRN  pantoprazole    Tablet 40 milliGRAM(s) Oral before breakfast      Review of Systems:  A 10-point review of systems was obtained.   Review of systems otherwise negative except as previously noted.    Allergies: No Known Allergies    For details regarding the patient's past medical history, social history, family history, and other miscellaneous elements, please refer the initial infectious diseases consultation and/or the admitting history and physical examination for this admission.    Objective:  Vitals:   T(C): 36.6 (12-26-23 @ 05:09), Max: 37.2 (12-25-23 @ 21:10)  HR: 70 (12-26-23 @ 05:09) (65 - 78)  BP: 146/78 (12-26-23 @ 05:09) (133/78 - 153/78)  RR: 18 (12-26-23 @ 05:09) (16 - 18)  SpO2: 96% (12-26-23 @ 05:09) (94% - 97%)    Physical Examination:  General: no acute distress  HEENT: NC/AT, EOMI,   Cardio: S1, S2 heard, RRR, no murmurs  Resp: decreased b/l breath sounds  Abd: soft, NT, ND  Ext: S/p R knee replacement, incision c/d/i, R leg cellulitis improving but still w/ erythema and swelling  Skin: warm, dry, no visible rash      Laboratory Studies:  CBC:                       12.8   11.37 )-----------( 296      ( 26 Dec 2023 06:00 )             37.9     CMP: 12-26    139  |  102  |  19  ----------------------------<  144<H>  4.1   |  31  |  1.10    Ca    9.4      26 Dec 2023 06:00    TPro  6.8  /  Alb  3.4  /  TBili  0.7  /  DBili  x   /  AST  19  /  ALT  29  /  AlkPhos  70  12-25    LIVER FUNCTIONS - ( 25 Dec 2023 06:00 )  Alb: 3.4 g/dL / Pro: 6.8 g/dL / ALK PHOS: 70 U/L / ALT: 29 U/L / AST: 19 U/L / GGT: x           Urinalysis Basic - ( 26 Dec 2023 06:00 )    Color: x / Appearance: x / SG: x / pH: x  Gluc: 144 mg/dL / Ketone: x  / Bili: x / Urobili: x   Blood: x / Protein: x / Nitrite: x   Leuk Esterase: x / RBC: x / WBC x   Sq Epi: x / Non Sq Epi: x / Bacteria: x        Microbiology: reviewed    Culture - Blood (collected 12-24-23 @ 09:40)  Source: .Blood Blood-Peripheral  Preliminary Report (12-25-23 @ 14:02):    No growth at 24 hours    Culture - Blood (collected 12-24-23 @ 09:40)  Source: .Blood Blood-Peripheral  Preliminary Report (12-25-23 @ 14:02):    No growth at 24 hours          Radiology: reviewed

## 2023-12-27 LAB
CK SERPL-CCNC: 90 U/L — SIGNIFICANT CHANGE UP (ref 39–308)
CK SERPL-CCNC: 90 U/L — SIGNIFICANT CHANGE UP (ref 39–308)

## 2023-12-27 PROCEDURE — 99233 SBSQ HOSP IP/OBS HIGH 50: CPT

## 2023-12-27 PROCEDURE — 93926 LOWER EXTREMITY STUDY: CPT | Mod: 26,LT

## 2023-12-27 RX ORDER — FUROSEMIDE 40 MG
40 TABLET ORAL ONCE
Refills: 0 | Status: COMPLETED | OUTPATIENT
Start: 2023-12-27 | End: 2023-12-27

## 2023-12-27 RX ADMIN — CELECOXIB 200 MILLIGRAM(S): 200 CAPSULE ORAL at 21:09

## 2023-12-27 RX ADMIN — Medication 1000 MILLIGRAM(S): at 05:31

## 2023-12-27 RX ADMIN — OXYCODONE HYDROCHLORIDE 5 MILLIGRAM(S): 5 TABLET ORAL at 10:30

## 2023-12-27 RX ADMIN — Medication 100 MILLIGRAM(S): at 21:10

## 2023-12-27 RX ADMIN — Medication 100 MILLIGRAM(S): at 13:55

## 2023-12-27 RX ADMIN — OXYCODONE HYDROCHLORIDE 5 MILLIGRAM(S): 5 TABLET ORAL at 09:50

## 2023-12-27 RX ADMIN — CELECOXIB 200 MILLIGRAM(S): 200 CAPSULE ORAL at 09:30

## 2023-12-27 RX ADMIN — LAMOTRIGINE 200 MILLIGRAM(S): 25 TABLET, ORALLY DISINTEGRATING ORAL at 21:09

## 2023-12-27 RX ADMIN — Medication 200 MILLIGRAM(S): at 11:39

## 2023-12-27 RX ADMIN — CELECOXIB 200 MILLIGRAM(S): 200 CAPSULE ORAL at 08:57

## 2023-12-27 RX ADMIN — AMLODIPINE BESYLATE 5 MILLIGRAM(S): 2.5 TABLET ORAL at 05:31

## 2023-12-27 RX ADMIN — CELECOXIB 200 MILLIGRAM(S): 200 CAPSULE ORAL at 22:56

## 2023-12-27 RX ADMIN — Medication 1000 MILLIGRAM(S): at 21:07

## 2023-12-27 RX ADMIN — Medication 100 MILLIGRAM(S): at 05:31

## 2023-12-27 RX ADMIN — Medication 1000 MILLIGRAM(S): at 13:56

## 2023-12-27 RX ADMIN — OXYCODONE HYDROCHLORIDE 5 MILLIGRAM(S): 5 TABLET ORAL at 00:30

## 2023-12-27 RX ADMIN — Medication 1000 MILLIGRAM(S): at 14:45

## 2023-12-27 RX ADMIN — Medication 40 MILLIGRAM(S): at 11:39

## 2023-12-27 RX ADMIN — ENOXAPARIN SODIUM 40 MILLIGRAM(S): 100 INJECTION SUBCUTANEOUS at 21:09

## 2023-12-27 RX ADMIN — Medication 1000 MILLIGRAM(S): at 22:56

## 2023-12-27 NOTE — PROGRESS NOTE ADULT - SUBJECTIVE AND OBJECTIVE BOX
Subjective: Patient seen and examined. No overnight events.  Does report pain in lower extremity persisting.  Can't bare weight still.     MEDICATIONS  (STANDING):  acetaminophen     Tablet .. 1000 milliGRAM(s) Oral every 8 hours  allopurinol 200 milliGRAM(s) Oral daily  amLODIPine   Tablet 5 milliGRAM(s) Oral daily  ceFAZolin   IVPB 2000 milliGRAM(s) IV Intermittent every 8 hours  ceFAZolin   IVPB      celecoxib 200 milliGRAM(s) Oral every 12 hours  enoxaparin Injectable 40 milliGRAM(s) SubCutaneous every 24 hours  lamoTRIgine 200 milliGRAM(s) Oral at bedtime  pantoprazole    Tablet 40 milliGRAM(s) Oral before breakfast    MEDICATIONS  (PRN):  HYDROmorphone  Injectable 0.5 milliGRAM(s) IV Push every 3 hours PRN Breakthrough pain  oxyCODONE    IR 5 milliGRAM(s) Oral every 4 hours PRN for moderate pain      Allergies    No Known Allergies    Intolerances        Vital Signs Last 24 Hrs  T(C): 36.9 (27 Dec 2023 05:07), Max: 36.9 (26 Dec 2023 13:51)  T(F): 98.4 (27 Dec 2023 05:07), Max: 98.4 (26 Dec 2023 13:51)  HR: 64 (27 Dec 2023 05:07) (64 - 76)  BP: 125/75 (27 Dec 2023 05:07) (125/75 - 158/88)  BP(mean): --  RR: 18 (27 Dec 2023 05:07) (16 - 18)  SpO2: 93% (27 Dec 2023 05:07) (93% - 95%)    Parameters below as of 27 Dec 2023 05:07  Patient On (Oxygen Delivery Method): room air        PHYSICAL EXAM:  GENERAL: NAD, well-groomed, well-developed  HEAD:  Atraumatic, Normocephalic  ENMT: Moist mucous membranes,   NECK: Supple, No JVD, Normal thyroid  NERVOUS SYSTEM:  All 4 extremities mobile, no gross sensory deficits.   CHEST/LUNG: Clear to auscultation bilaterally; No rales, rhonchi, wheezing, or rubs  HEART: Regular rate and rhythm; No murmurs, rubs, or gallops  ABDOMEN: Soft, Nontender, Nondistended; Bowel sounds present  EXTREMITIES:  Lower Extremity swelling erytheyma and tenderness       LABS:      Ca    9.4        26 Dec 2023 06:00        Urinalysis Basic - ( 26 Dec 2023 06:00 )    Color: x / Appearance: x / SG: x / pH: x  Gluc: 144 mg/dL / Ketone: x  / Bili: x / Urobili: x   Blood: x / Protein: x / Nitrite: x   Leuk Esterase: x / RBC: x / WBC x   Sq Epi: x / Non Sq Epi: x / Bacteria: x      CAPILLARY BLOOD GLUCOSE          RADIOLOGY & ADDITIONAL TESTS:    Imaging Personally Reviewed:  [ ] YES     Consultant(s) Notes Reviewed:      Care Discussed with Consultants/Other Providers:    Advanced Directives: [ ] DNR  [ ] No feeding tube  [ ] MOLST in chart  [ ] MOLST completed today  [ ] Unknown

## 2023-12-27 NOTE — PROGRESS NOTE ADULT - SUBJECTIVE AND OBJECTIVE BOX
[facility], Division of Infectious Diseases  ROBERTO CARLOS Louise Y. Patel, S. Shah, G. [facility]  [phone]    Name: ELIZABETH BAEZ  Age: 63y  Gender: Male  MRN: [id number]    Interval History:  Patient seen and examined at bedside this morning  No acute overnight events. Afebrile  Still having pain and unable to bear weight on RLE  Notes reviewed    Antibiotics:  ceFAZolin   IVPB 2000 milliGRAM(s) IV Intermittent every 8 hours  ceFAZolin   IVPB          Medications:  acetaminophen     Tablet .. 1000 milliGRAM(s) Oral every 8 hours  allopurinol 200 milliGRAM(s) Oral daily  amLODIPine   Tablet 5 milliGRAM(s) Oral daily  ceFAZolin   IVPB      ceFAZolin   IVPB 2000 milliGRAM(s) IV Intermittent every 8 hours  celecoxib 200 milliGRAM(s) Oral every 12 hours  enoxaparin Injectable 40 milliGRAM(s) SubCutaneous every 24 hours  HYDROmorphone  Injectable 0.5 milliGRAM(s) IV Push every 3 hours PRN  lamoTRIgine 200 milliGRAM(s) Oral at bedtime  oxyCODONE    IR 5 milliGRAM(s) Oral every 4 hours PRN  pantoprazole    Tablet 40 milliGRAM(s) Oral before breakfast      Review of Systems:  A 10-point review of systems was obtained.   Review of systems otherwise negative except as previously noted.    Allergies: No Known Allergies    For details regarding the patient's past medical history, social history, family history, and other miscellaneous elements, please refer the initial infectious diseases consultation and/or the admitting history and physical examination for this admission.    Objective:  Vitals:   T(C): 36.9 (12-27-23 @ 05:07), Max: 36.9 (12-26-23 @ 13:51)  HR: 64 (12-27-23 @ 05:07) (64 - 76)  BP: 125/75 (12-27-23 @ 05:07) (125/75 - 158/88)  RR: 18 (12-27-23 @ 05:07) (16 - 18)  SpO2: 93% (12-27-23 @ 05:07) (93% - 95%)    Physical Examination:  General: no acute distress  HEENT: NC/AT, EOMI  Cardio: RRR  Resp: decreased breath sounds  Abd: soft, NT, ND  Ext: RLE cellulitis slightly improving, knee incision c/d/i, ?increasing or stable duskiness of toes  Skin: warm, dry, no visible rash      Laboratory Studies:  CBC:                       12.8   11.37 )-----------( 296      ( 26 Dec 2023 06:00 )             37.9     CMP: 12-26    139  |  102  |  19  ----------------------------<  144<H>  4.1   |  31  |  1.10    Ca    9.4      26 Dec 2023 06:00        Urinalysis Basic - ( 26 Dec 2023 06:00 )    Color: x / Appearance: x / SG: x / pH: x  Gluc: 144 mg/dL / Ketone: x  / Bili: x / Urobili: x   Blood: x / Protein: x / Nitrite: x   Leuk Esterase: x / RBC: x / WBC x   Sq Epi: x / Non Sq Epi: x / Bacteria: x        Microbiology: reviewed    Culture - Blood (collected 12-24-23 @ 09:40)  Source: .Blood Blood-Peripheral  Preliminary Report (12-26-23 @ 14:01):    No growth at 48 Hours    Culture - Blood (collected 12-24-23 @ 09:40)  Source: .Blood Blood-Peripheral  Preliminary Report (12-26-23 @ 14:01):    No growth at 48 Hours          Radiology: reviewed       [facility] [place], Division of Infectious Diseases  ROBERTO CARLOS Louise Y. Patel, S. Shah, G. [facility]  [phone]    Name: ELIZABETH BAEZ  Age: 63y  Gender: Male  MRN: [id number]    Interval History:  Patient seen and examined at bedside this morning  No acute overnight events. Afebrile  Still having pain and unable to bear weight on RLE  Notes reviewed    Antibiotics:  ceFAZolin   IVPB 2000 milliGRAM(s) IV Intermittent every 8 hours  ceFAZolin   IVPB          Medications:  acetaminophen     Tablet .. 1000 milliGRAM(s) Oral every 8 hours  allopurinol 200 milliGRAM(s) Oral daily  amLODIPine   Tablet 5 milliGRAM(s) Oral daily  ceFAZolin   IVPB      ceFAZolin   IVPB 2000 milliGRAM(s) IV Intermittent every 8 hours  celecoxib 200 milliGRAM(s) Oral every 12 hours  enoxaparin Injectable 40 milliGRAM(s) SubCutaneous every 24 hours  HYDROmorphone  Injectable 0.5 milliGRAM(s) IV Push every 3 hours PRN  lamoTRIgine 200 milliGRAM(s) Oral at bedtime  oxyCODONE    IR 5 milliGRAM(s) Oral every 4 hours PRN  pantoprazole    Tablet 40 milliGRAM(s) Oral before breakfast      Review of Systems:  A 10-point review of systems was obtained.   Review of systems otherwise negative except as previously noted.    Allergies: No Known Allergies    For details regarding the patient's past medical history, social history, family history, and other miscellaneous elements, please refer the initial infectious diseases consultation and/or the admitting history and physical examination for this admission.    Objective:  Vitals:   T(C): 36.9 (12-27-23 @ 05:07), Max: 36.9 (12-26-23 @ 13:51)  HR: 64 (12-27-23 @ 05:07) (64 - 76)  BP: 125/75 (12-27-23 @ 05:07) (125/75 - 158/88)  RR: 18 (12-27-23 @ 05:07) (16 - 18)  SpO2: 93% (12-27-23 @ 05:07) (93% - 95%)    Physical Examination:  General: no acute distress  HEENT: NC/AT, EOMI  Cardio: RRR  Resp: decreased breath sounds  Abd: soft, NT, ND  Ext: RLE cellulitis slightly improving, knee incision c/d/i, ?increasing or stable duskiness of toes  Skin: warm, dry, no visible rash      Laboratory Studies:  CBC:                       12.8   11.37 )-----------( 296      ( 26 Dec 2023 06:00 )             37.9     CMP: 12-26    139  |  102  |  19  ----------------------------<  144<H>  4.1   |  31  |  1.10    Ca    9.4      26 Dec 2023 06:00        Urinalysis Basic - ( 26 Dec 2023 06:00 )    Color: x / Appearance: x / SG: x / pH: x  Gluc: 144 mg/dL / Ketone: x  / Bili: x / Urobili: x   Blood: x / Protein: x / Nitrite: x   Leuk Esterase: x / RBC: x / WBC x   Sq Epi: x / Non Sq Epi: x / Bacteria: x        Microbiology: reviewed    Culture - Blood (collected 12-24-23 @ 09:40)  Source: .Blood Blood-Peripheral  Preliminary Report (12-26-23 @ 14:01):    No growth at 48 Hours    Culture - Blood (collected 12-24-23 @ 09:40)  Source: .Blood Blood-Peripheral  Preliminary Report (12-26-23 @ 14:01):    No growth at 48 Hours          Radiology: reviewed

## 2023-12-27 NOTE — PROGRESS NOTE ADULT - ASSESSMENT
63M HTN, GOUT, GIRISH, with recent Left TKR admitted for Left Lower Extremity Cellulitis     Left Lower Leg Cellulitis  US negative for DVT but leg still painful and erythematous; Will get vascular workup done  IV Cefazolin will most likely switch to oral in 24 hours for discharge   I suspect portal of entry most likely is related to patient Athletes Foot in both feet  One dose of Oral Fluconazole given   Concern for seeding recent hardware  Consult ID and Orthopedics     Tinea Pedis  Will give one dose of Fluconazole oral   ID Consulted    Aftercare Following S/P Left TKR 12/18/2023  Pain control with Tylenol + Celebrex and Oxycodone  Aspirin BID for DVT Prophylaxis    HTN  Amlodipine    Gout   Allopurinol     Diet  Regular    DVT Prophylaxis  Lovenox    Disposition   Discharge planning pending hospital course

## 2023-12-27 NOTE — PROGRESS NOTE ADULT - SUBJECTIVE AND OBJECTIVE BOX
POST OPERATIVE DAY #: 9    63y Male  s/p  Left TKA    admitted for Left calf cellulitis, Improving on IV antibiotics                SUBJECTIVE: Patient seen and examined at bedside. Patient complaining of severe pain in the calf when his leg is dangling, minimal pain with leg elevated      Pain:  well controlled       Pain scale:  10 /10 no rarely taking Oxycodone   Denies CP, SOB, N/V/D, weakness, numbness   No new complains     OBJECTIVE:     Vital Signs Last 24 Hrs  T(C): 36.9 (27 Dec 2023 05:07), Max: 36.9 (26 Dec 2023 13:51)  T(F): 98.4 (27 Dec 2023 05:07), Max: 98.4 (26 Dec 2023 13:51)  HR: 64 (27 Dec 2023 05:07) (64 - 76)  BP: 125/75 (27 Dec 2023 05:07) (125/75 - 158/88)  BP(mean): --  RR: 18 (27 Dec 2023 05:07) (16 - 18)  SpO2: 93% (27 Dec 2023 05:07) (93% - 95%)    Parameters below as of 27 Dec 2023 05:07  Patient On (Oxygen Delivery Method): room air        Affected extremity: Left lower extremity         Incision: + prineo, intact and dry, Improving erythema distal knee extending to the ankle, improving swelling, + TTP, good knee and ankle ROM. DP +2          Sensation: intact to light touch          Motor exam:   5/ 5 Tibialis Anterior/Gastrocnemius-Soleus, EHL/FHL         warm, well-perfused; capillary refill < 3 seconds         negative calf tenderness right lower extremity LE    LABS:                        12.8   11.37 )-----------( 296      ( 26 Dec 2023 06:00 )             37.9     12-26    139  |  102  |  19  ----------------------------<  144<H>  4.1   |  31  |  1.10    Ca    9.4      26 Dec 2023 06:00          MEDICATIONS:  Infection prophylaxis:  ceFAZolin   IVPB 2000 milliGRAM(s) IV Intermittent every 8 hours  ceFAZolin   IVPB        Pain management:  acetaminophen     Tablet .. 1000 milliGRAM(s) Oral every 8 hours  celecoxib 200 milliGRAM(s) Oral every 12 hours  HYDROmorphone  Injectable 0.5 milliGRAM(s) IV Push every 3 hours PRN  lamoTRIgine 200 milliGRAM(s) Oral at bedtime  oxyCODONE    IR 5 milliGRAM(s) Oral every 4 hours PRN    DVT prophylaxis:   enoxaparin Injectable 40 milliGRAM(s) SubCutaneous every 24 hours      RADIOLOGY & ADDITIONAL STUDIES:    ASSESSMENT AND PLAN:   - Left calf/shine nonpurulant cellulitis: ID recommendations are appreciated, switched to IV Cefazolin. pending progress and final antibiotic recommendations for discharge    - Analgesic pain control as needed  - DVT prophylaxis: Lovenox 40mg daily SCDs       - Pain management:  Encourage to take oxycodone to be able to participate with PT and improve pain when ambulating. Celebrex 200mg twice a day, tylenol  - PT/OT: Weight Bearing Status:  Weight bearing as tolerated, OOBTC       -  Incentive spirometry  - Advance diet as tolerated  - Hospitalist is following  -  Follow up labs  -  Disposition: Home once stable

## 2023-12-27 NOTE — PROGRESS NOTE ADULT - ASSESSMENT
63 M with PMHx of HTN, GOUT, ADHD, GIRISH, B/L knees OA, s/p Left TKR 12/18/2023 with Dr. Jasmine. Patients states he was discharge from the hospital POD1 and was ambulating without any issues, last two days PTA he noted to have his calf begin to hurt and was getting progressively worse.     RECOMMENDATIONS  consistent with nonpurulent cellulitis and knee does not appear to be involved   appreciate ortho recs  MRSA swab neg, but positive S. aureus  BCx NGTD  DVT study negative  C/w cefazolin 2gm IV q8h  Vascular c/s, may need ROSEANNE/PVR given exam  Pain control  WBAT  Monitor for improvement  Further recs to follow    D/w Dr. Patel  Infectious Diseases will continue to follow. Please call with any questions.   Fabby Zacarias M.D.  [facility] of Infectious Diseases [phone]  For after 5 P.M. and weekends, please call [phone]     63 M with PMHx of HTN, GOUT, ADHD, GIRISH, B/L knees OA, s/p Left TKR 12/18/2023 with Dr. Jasmine. Patients states he was discharge from the hospital POD1 and was ambulating without any issues, last two days PTA he noted to have his calf begin to hurt and was getting progressively worse.     RECOMMENDATIONS  consistent with nonpurulent cellulitis and knee does not appear to be involved   appreciate ortho recs  MRSA swab neg, but positive S. aureus  BCx NGTD  DVT study negative  C/w cefazolin 2gm IV q8h  Vascular c/s, may need ROSEANNE/PVR given exam  Pain control  WBAT  Monitor for improvement  Further recs to follow    D/w Dr. Patel  Infectious Diseases will continue to follow. Please call with any questions.   Fabby Zacarias M.D.  [facility] of Infectious Diseases [phone]  For after 5 P.M. and weekends, please call [phone]

## 2023-12-28 PROCEDURE — 99233 SBSQ HOSP IP/OBS HIGH 50: CPT

## 2023-12-28 RX ADMIN — AMLODIPINE BESYLATE 5 MILLIGRAM(S): 2.5 TABLET ORAL at 05:31

## 2023-12-28 RX ADMIN — Medication 1000 MILLIGRAM(S): at 22:25

## 2023-12-28 RX ADMIN — OXYCODONE HYDROCHLORIDE 5 MILLIGRAM(S): 5 TABLET ORAL at 14:00

## 2023-12-28 RX ADMIN — OXYCODONE HYDROCHLORIDE 5 MILLIGRAM(S): 5 TABLET ORAL at 06:24

## 2023-12-28 RX ADMIN — Medication 100 MILLIGRAM(S): at 21:50

## 2023-12-28 RX ADMIN — Medication 1000 MILLIGRAM(S): at 14:00

## 2023-12-28 RX ADMIN — OXYCODONE HYDROCHLORIDE 5 MILLIGRAM(S): 5 TABLET ORAL at 23:24

## 2023-12-28 RX ADMIN — Medication 1000 MILLIGRAM(S): at 06:24

## 2023-12-28 RX ADMIN — OXYCODONE HYDROCHLORIDE 5 MILLIGRAM(S): 5 TABLET ORAL at 03:21

## 2023-12-28 RX ADMIN — LAMOTRIGINE 200 MILLIGRAM(S): 25 TABLET, ORALLY DISINTEGRATING ORAL at 21:51

## 2023-12-28 RX ADMIN — CELECOXIB 200 MILLIGRAM(S): 200 CAPSULE ORAL at 12:55

## 2023-12-28 RX ADMIN — Medication 1000 MILLIGRAM(S): at 05:31

## 2023-12-28 RX ADMIN — Medication 1000 MILLIGRAM(S): at 21:51

## 2023-12-28 RX ADMIN — Medication 100 MILLIGRAM(S): at 05:31

## 2023-12-28 RX ADMIN — CELECOXIB 200 MILLIGRAM(S): 200 CAPSULE ORAL at 12:25

## 2023-12-28 RX ADMIN — CELECOXIB 200 MILLIGRAM(S): 200 CAPSULE ORAL at 21:50

## 2023-12-28 RX ADMIN — OXYCODONE HYDROCHLORIDE 5 MILLIGRAM(S): 5 TABLET ORAL at 13:15

## 2023-12-28 RX ADMIN — CELECOXIB 200 MILLIGRAM(S): 200 CAPSULE ORAL at 22:25

## 2023-12-28 RX ADMIN — ENOXAPARIN SODIUM 40 MILLIGRAM(S): 100 INJECTION SUBCUTANEOUS at 21:51

## 2023-12-28 NOTE — PROGRESS NOTE ADULT - SUBJECTIVE AND OBJECTIVE BOX
[facility], Division of Infectious Diseases  ROBERTO CARLOS Louise Y. Patel, S. Shah, G. [facility]  [phone]    Name: ELIZABETH BAEZ  Age: 63y  Gender: Male  MRN: [id number]    Interval History:  Patient seen and examined at bedside this morning  No acute overnight events. Afebrile  Still having pain and unable to bear weight on RLE  Erythema improving  Notes reviewed    Antibiotics:  ceFAZolin   IVPB 2000 milliGRAM(s) IV Intermittent every 8 hours  ceFAZolin   IVPB          Medications:  acetaminophen     Tablet .. 1000 milliGRAM(s) Oral every 8 hours  allopurinol 200 milliGRAM(s) Oral daily  amLODIPine   Tablet 5 milliGRAM(s) Oral daily  ceFAZolin   IVPB      ceFAZolin   IVPB 2000 milliGRAM(s) IV Intermittent every 8 hours  celecoxib 200 milliGRAM(s) Oral every 12 hours  enoxaparin Injectable 40 milliGRAM(s) SubCutaneous every 24 hours  HYDROmorphone  Injectable 0.5 milliGRAM(s) IV Push every 3 hours PRN  lamoTRIgine 200 milliGRAM(s) Oral at bedtime  oxyCODONE    IR 5 milliGRAM(s) Oral every 4 hours PRN  pantoprazole    Tablet 40 milliGRAM(s) Oral before breakfast      Review of Systems:  A 10-point review of systems was obtained.   Review of systems otherwise negative except as previously noted.    Allergies: No Known Allergies    For details regarding the patient's past medical history, social history, family history, and other miscellaneous elements, please refer the initial infectious diseases consultation and/or the admitting history and physical examination for this admission.    Objective:  Vital Signs Last 24 Hrs  T(C): 36.6 (28 Dec 2023 05:16), Max: 36.6 (28 Dec 2023 05:16)  T(F): 97.8 (28 Dec 2023 05:16), Max: 97.8 (28 Dec 2023 05:16)  HR: 63 (28 Dec 2023 05:16) (63 - 74)  BP: 134/81 (28 Dec 2023 05:16) (134/81 - 146/84)  BP(mean): --  RR: 18 (28 Dec 2023 05:16) (18 - 18)  SpO2: 91% (28 Dec 2023 05:16) (91% - 93%)    Parameters below as of 28 Dec 2023 05:16  Patient On (Oxygen Delivery Method): room air        Physical Examination:  General: no acute distress  HEENT: NC/AT, EOMI  Cardio: RRR  Resp: decreased breath sounds  Abd: soft, NT, ND  Ext: RLE cellulitis erythema resolving. swelling slightly better. knee incision c/d/i, ?increasing or stable duskiness of toes  Skin: warm, dry, no visible rash      Laboratory Studies:  CBC:                       12.8   11.37 )-----------( 296      ( 26 Dec 2023 06:00 )             37.9     CMP: 12-26    139  |  102  |  19  ----------------------------<  144<H>  4.1   |  31  |  1.10    Ca    9.4      26 Dec 2023 06:00        Urinalysis Basic - ( 26 Dec 2023 06:00 )    Color: x / Appearance: x / SG: x / pH: x  Gluc: 144 mg/dL / Ketone: x  / Bili: x / Urobili: x   Blood: x / Protein: x / Nitrite: x   Leuk Esterase: x / RBC: x / WBC x   Sq Epi: x / Non Sq Epi: x / Bacteria: x        Microbiology: reviewed    Culture - Blood (collected 12-24-23 @ 09:40)  Source: .Blood Blood-Peripheral  Preliminary Report (12-26-23 @ 14:01):    No growth at 48 Hours    Culture - Blood (collected 12-24-23 @ 09:40)  Source: .Blood Blood-Peripheral  Preliminary Report (12-26-23 @ 14:01):    No growth at 48 Hours          Radiology: reviewed    < from: US Duplex Arterial Lower Ext Ltd, Left (12.27.23 @ 12:29) >    ACC: [id number] EXAM:  US DPLX LWR EXT [facility] [employer] LTD LT   ORDERED BY: ZAYDA GONZALEZ     PROCEDURE DATE:  12/27/2023          INTERPRETATION:  EXAM:  US DPLX LWR EXT ARTS LTD LT    PROCEDURE DATE: 12/27/2023    INTERPRETATION:  CLINICAL INFORMATION: Recent knee surgery, pain. DVT   negative. Dusky appearing lower extremity/toes.    COMPARISON: Left lower extremity venous duplex 12/26/2023    TECHNIQUE: Left lower extremity arterial duplex study.    FINDINGS: Duplex evaluation of the distributive arteries of the left   lower extremity was performed. The arteries above the knee are patent.   Intimal hypertrophy is present. There are elevated systolic velocities in   level of the mid superficial femoral artery to the popliteal artery.    Below the knee, the anterior and posterior tibial and peroneal arteries   appear patent; however, appear to have dampened waveforms at the level of   the dorsalis pedis artery.     PEAK SYSTOLIC VELOCITIES ARE AS FOLLOWS:    Left lower extremity    Common femoral artery 126cm/s  Superficial femoral artery proximal 98 cm/s, mid 141 cm/s, distal 177cm/s  Popliteal artery 144cm/s  Posterior tibial artery 95 cm/s  Peroneal artery 68 cm/s  Anterior tibial artery 82 cm/s  Dorsalis pedis artery  46 cm/s    IMPRESSION:    Mildly elevated arterial velocities in the level of the mid superficial   femoral artery to posterior tibial artery, without discrete stenosis or   occlusion in the left lower extremity. Intimal hypertrophy is noted.    --- End of Report ---          REINA CHRIS MD; Resident Radiologist  This document has been electronically signed.  NANCY SLAUGHTER MD; Attending Radiologist  This document has been electronically signed. Dec 27 2023 10:32PM    < end of copied text >     [facility], Division of Infectious Diseases  ROBERTO CARLOS Louise Y. Patel, S. Shah, G. [facility]  [phone]    Name: ELIZABETH BAEZ  Age: 63y  Gender: Male  MRN: [id number]    Interval History:  Patient seen and examined at bedside this morning  No acute overnight events. Afebrile  Still having pain and unable to bear weight on RLE  Erythema improving  Notes reviewed    Antibiotics:  ceFAZolin   IVPB 2000 milliGRAM(s) IV Intermittent every 8 hours  ceFAZolin   IVPB          Medications:  acetaminophen     Tablet .. 1000 milliGRAM(s) Oral every 8 hours  allopurinol 200 milliGRAM(s) Oral daily  amLODIPine   Tablet 5 milliGRAM(s) Oral daily  ceFAZolin   IVPB      ceFAZolin   IVPB 2000 milliGRAM(s) IV Intermittent every 8 hours  celecoxib 200 milliGRAM(s) Oral every 12 hours  enoxaparin Injectable 40 milliGRAM(s) SubCutaneous every 24 hours  HYDROmorphone  Injectable 0.5 milliGRAM(s) IV Push every 3 hours PRN  lamoTRIgine 200 milliGRAM(s) Oral at bedtime  oxyCODONE    IR 5 milliGRAM(s) Oral every 4 hours PRN  pantoprazole    Tablet 40 milliGRAM(s) Oral before breakfast      Review of Systems:  A 10-point review of systems was obtained.   Review of systems otherwise negative except as previously noted.    Allergies: No Known Allergies    For details regarding the patient's past medical history, social history, family history, and other miscellaneous elements, please refer the initial infectious diseases consultation and/or the admitting history and physical examination for this admission.    Objective:  Vital Signs Last 24 Hrs  T(C): 36.6 (28 Dec 2023 05:16), Max: 36.6 (28 Dec 2023 05:16)  T(F): 97.8 (28 Dec 2023 05:16), Max: 97.8 (28 Dec 2023 05:16)  HR: 63 (28 Dec 2023 05:16) (63 - 74)  BP: 134/81 (28 Dec 2023 05:16) (134/81 - 146/84)  BP(mean): --  RR: 18 (28 Dec 2023 05:16) (18 - 18)  SpO2: 91% (28 Dec 2023 05:16) (91% - 93%)    Parameters below as of 28 Dec 2023 05:16  Patient On (Oxygen Delivery Method): room air        Physical Examination:  General: no acute distress  HEENT: NC/AT, EOMI  Cardio: RRR  Resp: decreased breath sounds  Abd: soft, NT, ND  Ext: RLE cellulitis erythema resolving. swelling slightly better. knee incision c/d/i, ?increasing or stable duskiness of toes  Skin: warm, dry, no visible rash      Laboratory Studies:  CBC:                       12.8   11.37 )-----------( 296      ( 26 Dec 2023 06:00 )             37.9     CMP: 12-26    139  |  102  |  19  ----------------------------<  144<H>  4.1   |  31  |  1.10    Ca    9.4      26 Dec 2023 06:00        Urinalysis Basic - ( 26 Dec 2023 06:00 )    Color: x / Appearance: x / SG: x / pH: x  Gluc: 144 mg/dL / Ketone: x  / Bili: x / Urobili: x   Blood: x / Protein: x / Nitrite: x   Leuk Esterase: x / RBC: x / WBC x   Sq Epi: x / Non Sq Epi: x / Bacteria: x        Microbiology: reviewed    Culture - Blood (collected 12-24-23 @ 09:40)  Source: .Blood Blood-Peripheral  Preliminary Report (12-26-23 @ 14:01):    No growth at 48 Hours    Culture - Blood (collected 12-24-23 @ 09:40)  Source: .Blood Blood-Peripheral  Preliminary Report (12-26-23 @ 14:01):    No growth at 48 Hours          Radiology: reviewed    < from: US Duplex Arterial Lower Ext Ltd, Left (12.27.23 @ 12:29) >    ACC: [id number] EXAM:  US DPLX LWR EXT [employer] LTD LT   ORDERED BY: ZAYDA GONZALEZ     PROCEDURE DATE:  12/27/2023          INTERPRETATION:  EXAM:  US DPLX LWR EXT ARTS LTD LT    PROCEDURE DATE: 12/27/2023    INTERPRETATION:  CLINICAL INFORMATION: Recent knee surgery, pain. DVT   negative. Dusky appearing lower extremity/toes.    COMPARISON: Left lower extremity venous duplex 12/26/2023    TECHNIQUE: Left lower extremity arterial duplex study.    FINDINGS: Duplex evaluation of the distributive arteries of the left   lower extremity was performed. The arteries above the knee are patent.   Intimal hypertrophy is present. There are elevated systolic velocities in   level of the mid superficial femoral artery to the popliteal artery.    Below the knee, the anterior and posterior tibial and peroneal arteries   appear patent; however, appear to have dampened waveforms at the level of   the dorsalis pedis artery.     PEAK SYSTOLIC VELOCITIES ARE AS FOLLOWS:    Left lower extremity    Common femoral artery 126cm/s  Superficial femoral artery proximal 98 cm/s, mid 141 cm/s, distal 177cm/s  Popliteal artery 144cm/s  Posterior tibial artery 95 cm/s  Peroneal artery 68 cm/s  Anterior tibial artery 82 cm/s  Dorsalis pedis artery  46 cm/s    IMPRESSION:    Mildly elevated arterial velocities in the level of the mid superficial   femoral artery to posterior tibial artery, without discrete stenosis or   occlusion in the left lower extremity. Intimal hypertrophy is noted.    --- End of Report ---          REINA CHRIS MD; Resident Radiologist  This document has been electronically signed.  NANCY SLAUGHTER MD; Attending Radiologist  This document has been electronically signed. Dec 27 2023 10:32PM    < end of copied text >

## 2023-12-28 NOTE — PROGRESS NOTE ADULT - ASSESSMENT
63 M with PMHx of HTN, GOUT, ADHD, GIRISH, B/L knees OA, s/p Left TKR 12/18/2023 with Dr. Jasmine. Patients states he was discharge from the hospital POD1 and was ambulating without any issues, last two days PTA he noted to have his calf begin to hurt and was getting progressively worse.     RECOMMENDATIONS  consistent with nonpurulent cellulitis and knee does not appear to be involved   appreciate ortho recs  MRSA swab neg, but positive S. aureus  BCx NGTD  DVT study negative  ROSEANNE--Mildly elevated arterial velocities in the level of the mid superficial   femoral artery to posterior tibial artery, without discrete stenosis or   occlusion in the left lower extremity. Intimal hypertrophy is noted.    Recommendations:  C/w cefazolin 2gm IV q8h  Appreciate vascular input  Pain control  WBAT  Monitor for improvement    D/w Dr. Patel  Infectious Diseases will continue to follow. Please call with any questions.   Fabby Zacarias M.D.  [facility] Division of Infectious Diseases [phone]  For after 5 P.M. and weekends, please call [phone]     63 M with PMHx of HTN, GOUT, ADHD, GIRISH, B/L knees OA, s/p Left TKR 12/18/2023 with Dr. Jasmine. Patients states he was discharge from the hospital POD1 and was ambulating without any issues, last two days PTA he noted to have his calf begin to hurt and was getting progressively worse.     RECOMMENDATIONS  consistent with nonpurulent cellulitis and knee does not appear to be involved   appreciate ortho recs  MRSA swab neg, but positive S. aureus  BCx NGTD  DVT study negative  ROSEANNE--Mildly elevated arterial velocities in the level of the mid superficial   femoral artery to posterior tibial artery, without discrete stenosis or   occlusion in the left lower extremity. Intimal hypertrophy is noted.    Recommendations:  C/w cefazolin 2gm IV q8h  Appreciate vascular input  Pain control  WBAT  Monitor for improvement    D/w Dr. Patel  Infectious Diseases will continue to follow. Please call with any questions.   Fabby Zacarias M.D.  [facility] Division of Infectious Diseases [phone]  For after 5 P.M. and weekends, please call [phone]

## 2023-12-28 NOTE — PROGRESS NOTE ADULT - ASSESSMENT
63M HTN, GOUT, GIRISH, with recent Left TKR admitted for Left Lower Extremity Cellulitis     Left Lower Leg Cellulitis  US negative for DVT but leg still painful and erythematous  IV Cefazolin and oral meds on discharge  Vascular consulted (Dr. Us)   I suspect portal of entry most likely is related to patient Athletes Foot in both feet  One dose of Oral Fluconazole given   Concern for seeding recent hardware  Consult ID and Orthopedics     Tinea Pedis  Will give one dose of Fluconazole oral   ID Consulted    Aftercare Following S/P Left TKR 12/18/2023  Pain control with Tylenol + Celebrex and Oxycodone  Aspirin BID for DVT Prophylaxis    HTN  Amlodipine    Gout   Allopurinol     Diet  Regular    DVT Prophylaxis  Lovenox    Disposition   Discharge planning pending hospital course

## 2023-12-28 NOTE — PROGRESS NOTE ADULT - SUBJECTIVE AND OBJECTIVE BOX
Subjective: Patient seen and examined. No overnight events. Very concerned that his pain is not improving.  Swelling improved with lasix. Still unable to ambulate but refusing to take oxycodone.      MEDICATIONS  (STANDING):  acetaminophen     Tablet .. 1000 milliGRAM(s) Oral every 8 hours  allopurinol 200 milliGRAM(s) Oral daily  amLODIPine   Tablet 5 milliGRAM(s) Oral daily  ceFAZolin   IVPB 2000 milliGRAM(s) IV Intermittent every 8 hours  ceFAZolin   IVPB      celecoxib 200 milliGRAM(s) Oral every 12 hours  enoxaparin Injectable 40 milliGRAM(s) SubCutaneous every 24 hours  lamoTRIgine 200 milliGRAM(s) Oral at bedtime  pantoprazole    Tablet 40 milliGRAM(s) Oral before breakfast    MEDICATIONS  (PRN):  HYDROmorphone  Injectable 0.5 milliGRAM(s) IV Push every 3 hours PRN Breakthrough pain  oxyCODONE    IR 5 milliGRAM(s) Oral every 4 hours PRN for moderate pain      Allergies    No Known Allergies    Intolerances        Vital Signs Last 24 Hrs  T(C): 36.6 (28 Dec 2023 05:16), Max: 36.6 (28 Dec 2023 05:16)  T(F): 97.8 (28 Dec 2023 05:16), Max: 97.8 (28 Dec 2023 05:16)  HR: 63 (28 Dec 2023 05:16) (63 - 74)  BP: 134/81 (28 Dec 2023 05:16) (134/81 - 146/84)  BP(mean): --  RR: 18 (28 Dec 2023 05:16) (18 - 18)  SpO2: 91% (28 Dec 2023 05:16) (91% - 93%)    Parameters below as of 28 Dec 2023 05:16  Patient On (Oxygen Delivery Method): room air        PHYSICAL EXAM:  GENERAL: NAD, well-groomed, well-developed  HEAD:  Atraumatic, Normocephalic  ENMT: Moist mucous membranes,   NECK: Supple, No JVD, Normal thyroid  NERVOUS SYSTEM:  All 4 extremities mobile, no gross sensory deficits.   CHEST/LUNG: Clear to auscultation bilaterally; No rales, rhonchi, wheezing, or rubs  HEART: Regular rate and rhythm; No murmurs, rubs, or gallops  ABDOMEN: Soft, Nontender, Nondistended; Bowel sounds present  EXTREMITIES:  2+ Peripheral Pulses, No clubbing, cyanosis, or edema      LABS:              CAPILLARY BLOOD GLUCOSE          RADIOLOGY & ADDITIONAL TESTS:    Imaging Personally Reviewed:  [ ] YES     Consultant(s) Notes Reviewed:      Care Discussed with Consultants/Other Providers:    Advanced Directives: [ ] DNR  [ ] No feeding tube  [ ] MOLST in chart  [ ] MOLST completed today  [ ] Unknown

## 2023-12-28 NOTE — PROGRESS NOTE ADULT - SUBJECTIVE AND OBJECTIVE BOX
POST OPERATIVE DAY 10    63y Male s/p  Left TKA  on 12/18,  admitted for Left calf cellulitis, now improving on IV antibiotics                SUBJECTIVE: Patient seen and examined at bedside.   Complaints remain unchanged. Reports severe pain in the calf when his leg is dangling, minimal pain with leg elevated.  Pain is well controlled. Last took Oxycodone early this morning.  Denies CP, SOB, N/V/D, weakness, numbness     OBJECTIVE:     Vital Signs Last 24 Hrs  T(C): 36.6 (28 Dec 2023 05:16), Max: 36.6 (28 Dec 2023 05:16)  T(F): 97.8 (28 Dec 2023 05:16), Max: 97.8 (28 Dec 2023 05:16)  HR: 63 (28 Dec 2023 05:16) (63 - 74)  BP: 134/81 (28 Dec 2023 05:16) (134/81 - 146/84)  BP(mean): --  RR: 18 (28 Dec 2023 05:16) (18 - 18)  SpO2: 91% (28 Dec 2023 05:16) (91% - 93%)    Parameters below as of 28 Dec 2023 05:16  Patient On (Oxygen Delivery Method): room air      Physical Exam:    Affected extremity: Left lower extremity         Incision: + prineo intact and dry,          Improving erythema distal knee extending to the ankle, tibia- 2+ pitting edema , + TTP, good knee and ankle ROM. DP +2          Sensation: intact to light touch          Motor exam:   5/ 5 Tibialis Anterior/Gastrocnemius-Soleus, EHL/FHL         warm, well-perfused; capillary refill < 3 seconds         negative calf tenderness    MEDICATIONS:  Infection prophylaxis:  ceFAZolin   IVPB 2000 milliGRAM(s) IV Intermittent every 8 hours  ceFAZolin   IVPB        Pain management:  acetaminophen     Tablet .. 1000 milliGRAM(s) Oral every 8 hours  celecoxib 200 milliGRAM(s) Oral every 12 hours  HYDROmorphone  Injectable 0.5 milliGRAM(s) IV Push every 3 hours PRN  lamoTRIgine 200 milliGRAM(s) Oral at bedtime  oxyCODONE    IR 5 milliGRAM(s) Oral every 4 hours PRN    DVT prophylaxis:   enoxaparin Injectable 40 milliGRAM(s) SubCutaneous every 24 hours    Radiology:   US Duplex Arterial Lower Ext Ltd, Left (12.27.23 @ 12:29)   IMPRESSION:    Mildly elevated arterial velocities in the level of the mid superficial   femoral artery to posterior tibial artery, without discrete stenosis or   occlusion in the left lower extremity. Intimal hypertrophy is noted.      ASSESSMENT AND PLAN:   - Left calf/shine nonpurulent cellulitis: ID recommendations are appreciated, Continue IV Cefazolin. Will monitor progress and await ID's final antibiotic recommendations for discharge    - Vascular consult is pending  - Analgesic pain control as needed  - DVT prophylaxis: Lovenox 40mg daily SCDs       - Pain management:  Continue to encourage pt to take oxycodone to be able to participate with PT and improve pain when ambulating. Continue celebrex 200mg twice a day, tylenol  - PT/OT: Weight Bearing Status:  Weight bearing as tolerated  -  Incentive spirometry  - Advance diet as tolerated  -  Hospitalist is following  -  Follow up labs  -  Disposition: Home once stable- after ID final recs and vascular consult/recommendations

## 2023-12-29 ENCOUNTER — TRANSCRIPTION ENCOUNTER (OUTPATIENT)
Age: 63
End: 2023-12-29

## 2023-12-29 LAB
ANION GAP SERPL CALC-SCNC: 9 MMOL/L — SIGNIFICANT CHANGE UP (ref 5–17)
ANION GAP SERPL CALC-SCNC: 9 MMOL/L — SIGNIFICANT CHANGE UP (ref 5–17)
BUN SERPL-MCNC: 22 MG/DL — SIGNIFICANT CHANGE UP (ref 7–23)
BUN SERPL-MCNC: 22 MG/DL — SIGNIFICANT CHANGE UP (ref 7–23)
CALCIUM SERPL-MCNC: 9.6 MG/DL — SIGNIFICANT CHANGE UP (ref 8.4–10.5)
CALCIUM SERPL-MCNC: 9.6 MG/DL — SIGNIFICANT CHANGE UP (ref 8.4–10.5)
CHLORIDE SERPL-SCNC: 100 MMOL/L — SIGNIFICANT CHANGE UP (ref 96–108)
CHLORIDE SERPL-SCNC: 100 MMOL/L — SIGNIFICANT CHANGE UP (ref 96–108)
CO2 SERPL-SCNC: 27 MMOL/L — SIGNIFICANT CHANGE UP (ref 22–31)
CO2 SERPL-SCNC: 27 MMOL/L — SIGNIFICANT CHANGE UP (ref 22–31)
CREAT SERPL-MCNC: 0.97 MG/DL — SIGNIFICANT CHANGE UP (ref 0.5–1.3)
CREAT SERPL-MCNC: 0.97 MG/DL — SIGNIFICANT CHANGE UP (ref 0.5–1.3)
CULTURE RESULTS: SIGNIFICANT CHANGE UP
EGFR: 88 ML/MIN/1.73M2 — SIGNIFICANT CHANGE UP
EGFR: 88 ML/MIN/1.73M2 — SIGNIFICANT CHANGE UP
GLUCOSE SERPL-MCNC: 120 MG/DL — HIGH (ref 70–99)
GLUCOSE SERPL-MCNC: 120 MG/DL — HIGH (ref 70–99)
HCT VFR BLD CALC: 38.9 % — LOW (ref 39–50)
HCT VFR BLD CALC: 38.9 % — LOW (ref 39–50)
HGB BLD-MCNC: 13.1 G/DL — SIGNIFICANT CHANGE UP (ref 13–17)
HGB BLD-MCNC: 13.1 G/DL — SIGNIFICANT CHANGE UP (ref 13–17)
MCHC RBC-ENTMCNC: 28.9 PG — SIGNIFICANT CHANGE UP (ref 27–34)
MCHC RBC-ENTMCNC: 28.9 PG — SIGNIFICANT CHANGE UP (ref 27–34)
MCHC RBC-ENTMCNC: 33.7 GM/DL — SIGNIFICANT CHANGE UP (ref 32–36)
MCHC RBC-ENTMCNC: 33.7 GM/DL — SIGNIFICANT CHANGE UP (ref 32–36)
MCV RBC AUTO: 85.7 FL — SIGNIFICANT CHANGE UP (ref 80–100)
MCV RBC AUTO: 85.7 FL — SIGNIFICANT CHANGE UP (ref 80–100)
NRBC # BLD: 0 /100 WBCS — SIGNIFICANT CHANGE UP (ref 0–0)
NRBC # BLD: 0 /100 WBCS — SIGNIFICANT CHANGE UP (ref 0–0)
PLATELET # BLD AUTO: 392 K/UL — SIGNIFICANT CHANGE UP (ref 150–400)
PLATELET # BLD AUTO: 392 K/UL — SIGNIFICANT CHANGE UP (ref 150–400)
POTASSIUM SERPL-MCNC: 3.8 MMOL/L — SIGNIFICANT CHANGE UP (ref 3.5–5.3)
POTASSIUM SERPL-MCNC: 3.8 MMOL/L — SIGNIFICANT CHANGE UP (ref 3.5–5.3)
POTASSIUM SERPL-SCNC: 3.8 MMOL/L — SIGNIFICANT CHANGE UP (ref 3.5–5.3)
POTASSIUM SERPL-SCNC: 3.8 MMOL/L — SIGNIFICANT CHANGE UP (ref 3.5–5.3)
RBC # BLD: 4.54 M/UL — SIGNIFICANT CHANGE UP (ref 4.2–5.8)
RBC # BLD: 4.54 M/UL — SIGNIFICANT CHANGE UP (ref 4.2–5.8)
RBC # FLD: 12.1 % — SIGNIFICANT CHANGE UP (ref 10.3–14.5)
RBC # FLD: 12.1 % — SIGNIFICANT CHANGE UP (ref 10.3–14.5)
SODIUM SERPL-SCNC: 136 MMOL/L — SIGNIFICANT CHANGE UP (ref 135–145)
SODIUM SERPL-SCNC: 136 MMOL/L — SIGNIFICANT CHANGE UP (ref 135–145)
SPECIMEN SOURCE: SIGNIFICANT CHANGE UP
WBC # BLD: 7.89 K/UL — SIGNIFICANT CHANGE UP (ref 3.8–10.5)
WBC # BLD: 7.89 K/UL — SIGNIFICANT CHANGE UP (ref 3.8–10.5)
WBC # FLD AUTO: 7.89 K/UL — SIGNIFICANT CHANGE UP (ref 3.8–10.5)
WBC # FLD AUTO: 7.89 K/UL — SIGNIFICANT CHANGE UP (ref 3.8–10.5)

## 2023-12-29 PROCEDURE — 73701 CT LOWER EXTREMITY W/DYE: CPT | Mod: 26,LT

## 2023-12-29 PROCEDURE — 99233 SBSQ HOSP IP/OBS HIGH 50: CPT

## 2023-12-29 RX ORDER — OXYCODONE HYDROCHLORIDE 5 MG/1
10 TABLET ORAL EVERY 4 HOURS
Refills: 0 | Status: DISCONTINUED | OUTPATIENT
Start: 2023-12-29 | End: 2024-01-01

## 2023-12-29 RX ADMIN — AMLODIPINE BESYLATE 5 MILLIGRAM(S): 2.5 TABLET ORAL at 06:49

## 2023-12-29 RX ADMIN — CELECOXIB 200 MILLIGRAM(S): 200 CAPSULE ORAL at 09:02

## 2023-12-29 RX ADMIN — OXYCODONE HYDROCHLORIDE 5 MILLIGRAM(S): 5 TABLET ORAL at 22:12

## 2023-12-29 RX ADMIN — Medication 100 MILLIGRAM(S): at 05:25

## 2023-12-29 RX ADMIN — LAMOTRIGINE 200 MILLIGRAM(S): 25 TABLET, ORALLY DISINTEGRATING ORAL at 21:11

## 2023-12-29 RX ADMIN — CELECOXIB 200 MILLIGRAM(S): 200 CAPSULE ORAL at 21:42

## 2023-12-29 RX ADMIN — Medication 100 MILLIGRAM(S): at 21:11

## 2023-12-29 RX ADMIN — Medication 200 MILLIGRAM(S): at 12:56

## 2023-12-29 RX ADMIN — CELECOXIB 200 MILLIGRAM(S): 200 CAPSULE ORAL at 08:32

## 2023-12-29 RX ADMIN — Medication 100 MILLIGRAM(S): at 12:57

## 2023-12-29 RX ADMIN — CELECOXIB 200 MILLIGRAM(S): 200 CAPSULE ORAL at 21:12

## 2023-12-29 RX ADMIN — Medication 1000 MILLIGRAM(S): at 13:30

## 2023-12-29 RX ADMIN — PANTOPRAZOLE SODIUM 40 MILLIGRAM(S): 20 TABLET, DELAYED RELEASE ORAL at 06:48

## 2023-12-29 RX ADMIN — OXYCODONE HYDROCHLORIDE 10 MILLIGRAM(S): 5 TABLET ORAL at 12:56

## 2023-12-29 RX ADMIN — OXYCODONE HYDROCHLORIDE 5 MILLIGRAM(S): 5 TABLET ORAL at 22:42

## 2023-12-29 RX ADMIN — Medication 1000 MILLIGRAM(S): at 21:41

## 2023-12-29 RX ADMIN — ENOXAPARIN SODIUM 40 MILLIGRAM(S): 100 INJECTION SUBCUTANEOUS at 21:11

## 2023-12-29 RX ADMIN — Medication 1000 MILLIGRAM(S): at 06:49

## 2023-12-29 RX ADMIN — Medication 1000 MILLIGRAM(S): at 12:56

## 2023-12-29 RX ADMIN — OXYCODONE HYDROCHLORIDE 5 MILLIGRAM(S): 5 TABLET ORAL at 00:24

## 2023-12-29 RX ADMIN — OXYCODONE HYDROCHLORIDE 10 MILLIGRAM(S): 5 TABLET ORAL at 13:30

## 2023-12-29 RX ADMIN — Medication 1000 MILLIGRAM(S): at 21:11

## 2023-12-29 RX ADMIN — Medication 1000 MILLIGRAM(S): at 06:00

## 2023-12-29 NOTE — DISCHARGE NOTE NURSING/CASE MANAGEMENT/SOCIAL WORK - PATIENT PORTAL LINK FT
You can access the FollowMyHealth Patient Portal offered by [facility] by registering at the following website: [email or web address]. By joining [employer]’s FollowMyHealth portal, you will also be able to view your health information using other applications (apps) compatible with our system. You can access the FollowMyHealth Patient Portal offered by [facility] by registering at the following website: [email or web address]. By joining [employer]’s FollowMyHealth portal, you will also be able to view your health information using other applications (apps) compatible with our system.

## 2023-12-29 NOTE — CASE MANAGEMENT PROGRESS NOTE - NSCMPROGRESSNOTE_GEN_ALL_CORE
Update Communication Note: As per rounds on [facility] [place],  Pending Vascular,  IV Ancef, see what I&D recommendations are.  Patient still having alot of pain. Not ready for Discharge. CM sent referral out awaiting response back. Will continue to follow patient.

## 2023-12-29 NOTE — CASE MANAGEMENT PROGRESS NOTE - NSCMPROGRESSNOTE_GEN_ALL_CORE
CM went to go talk with patient but they took patient down for CT-scan. Will continue to follow patient.

## 2023-12-29 NOTE — PROGRESS NOTE ADULT - SUBJECTIVE AND OBJECTIVE BOX
[facility], Division of Infectious Diseases  ROBERTO CARLOS Louise Y. Patel, S. Shah, G. [facility]  [phone]    Name: ELIZABETH BAEZ  Age: 63y  Gender: Male  MRN: [id number]    Interval History:  Patient seen and examined at bedside  No acute overnight events. Afebrile  No complaints  Notes reviewed    Antibiotics:  ceFAZolin   IVPB      ceFAZolin   IVPB 2000 milliGRAM(s) IV Intermittent every 8 hours      Medications:  acetaminophen     Tablet .. 1000 milliGRAM(s) Oral every 8 hours  allopurinol 200 milliGRAM(s) Oral daily  amLODIPine   Tablet 5 milliGRAM(s) Oral daily  ceFAZolin   IVPB      ceFAZolin   IVPB 2000 milliGRAM(s) IV Intermittent every 8 hours  celecoxib 200 milliGRAM(s) Oral every 12 hours  enoxaparin Injectable 40 milliGRAM(s) SubCutaneous every 24 hours  HYDROmorphone  Injectable 0.5 milliGRAM(s) IV Push every 3 hours PRN  lamoTRIgine 200 milliGRAM(s) Oral at bedtime  oxyCODONE    IR 10 milliGRAM(s) Oral every 4 hours PRN  oxyCODONE    IR 5 milliGRAM(s) Oral every 4 hours PRN  pantoprazole    Tablet 40 milliGRAM(s) Oral before breakfast      Review of Systems:  Review of systems otherwise negative except as previously noted.    Allergies: No Known Allergies    For details regarding the patient's past medical history, social history, family history, and other miscellaneous elements, please refer the initial infectious diseases consultation and/or the admitting history and physical examination for this admission.    Objective:  Vitals:   T(C): 36.5 (12-29-23 @ 04:46), Max: 36.8 (12-28-23 @ 21:30)  HR: 64 (12-29-23 @ 04:46) (64 - 67)  BP: 155/77 (12-29-23 @ 04:46) (146/81 - 155/88)  RR: 18 (12-29-23 @ 04:46) (16 - 18)  SpO2: 96% (12-29-23 @ 04:46) (94% - 97%)    Physical Examination:  General: no acute distress  HEENT: NC/AT, EOMI,   Cardio: RRR  Resp: decreased b/l breath sounds  Abd: soft, NT, ND  Ext: no edema or cyanosis, RLE erythema resolved  Skin: warm, dry, no visible rash      Laboratory Studies:  CBC:                       13.1   7.89  )-----------( 392      ( 29 Dec 2023 08:14 )             38.9     CMP: 12-29    136  |  100  |  22  ----------------------------<  120<H>  3.8   |  27  |  0.97    Ca    9.6      29 Dec 2023 08:14        Urinalysis Basic - ( 29 Dec 2023 08:14 )    Color: x / Appearance: x / SG: x / pH: x  Gluc: 120 mg/dL / Ketone: x  / Bili: x / Urobili: x   Blood: x / Protein: x / Nitrite: x   Leuk Esterase: x / RBC: x / WBC x   Sq Epi: x / Non Sq Epi: x / Bacteria: x        Microbiology: reviewed    Culture - Blood (collected 12-24-23 @ 09:40)  Source: .Blood Blood-Peripheral  Preliminary Report (12-28-23 @ 14:00):    No growth at 4 days    Culture - Blood (collected 12-24-23 @ 09:40)  Source: .Blood Blood-Peripheral  Preliminary Report (12-28-23 @ 14:00):    No growth at 4 days          Radiology: reviewed       [facility], Division of Infectious Diseases  ROBERTO CARLOS Louise Y. Patel, S. Shah, G. [facility]  [phone]    Name: ELIZABETH BAEZ  Age: 63y  Gender: Male  MRN: [id number]    Interval History:  Patient seen and examined at bedside  No acute overnight events. Afebrile  No complaints  Notes reviewed    Antibiotics:  ceFAZolin   IVPB      ceFAZolin   IVPB 2000 milliGRAM(s) IV Intermittent every 8 hours      Medications:  acetaminophen     Tablet .. 1000 milliGRAM(s) Oral every 8 hours  allopurinol 200 milliGRAM(s) Oral daily  amLODIPine   Tablet 5 milliGRAM(s) Oral daily  ceFAZolin   IVPB      ceFAZolin   IVPB 2000 milliGRAM(s) IV Intermittent every 8 hours  celecoxib 200 milliGRAM(s) Oral every 12 hours  enoxaparin Injectable 40 milliGRAM(s) SubCutaneous every 24 hours  HYDROmorphone  Injectable 0.5 milliGRAM(s) IV Push every 3 hours PRN  lamoTRIgine 200 milliGRAM(s) Oral at bedtime  oxyCODONE    IR 10 milliGRAM(s) Oral every 4 hours PRN  oxyCODONE    IR 5 milliGRAM(s) Oral every 4 hours PRN  pantoprazole    Tablet 40 milliGRAM(s) Oral before breakfast      Review of Systems:  Review of systems otherwise negative except as previously noted.    Allergies: No Known Allergies    For details regarding the patient's past medical history, social history, family history, and other miscellaneous elements, please refer the initial infectious diseases consultation and/or the admitting history and physical examination for this admission.    Objective:  Vitals:   T(C): 36.5 (12-29-23 @ 04:46), Max: 36.8 (12-28-23 @ 21:30)  HR: 64 (12-29-23 @ 04:46) (64 - 67)  BP: 155/77 (12-29-23 @ 04:46) (146/81 - 155/88)  RR: 18 (12-29-23 @ 04:46) (16 - 18)  SpO2: 96% (12-29-23 @ 04:46) (94% - 97%)    Physical Examination:  General: no acute distress  HEENT: NC/AT, EOMI,   Cardio: RRR  Resp: decreased b/l breath sounds  Abd: soft, NT, ND  Ext: no edema or cyanosis, RLE erythema resolved  Skin: warm, dry, no visible rash      Laboratory Studies:  CBC:                       13.1   7.89  )-----------( 392      ( 29 Dec 2023 08:14 )             38.9     CMP: 12-29    136  |  100  |  22  ----------------------------<  120<H>  3.8   |  27  |  0.97    Ca    9.6      29 Dec 2023 08:14        Urinalysis Basic - ( 29 Dec 2023 08:14 )    Color: x / Appearance: x / SG: x / pH: x  Gluc: 120 mg/dL / Ketone: x  / Bili: x / Urobili: x   Blood: x / Protein: x / Nitrite: x   Leuk Esterase: x / RBC: x / WBC x   Sq Epi: x / Non Sq Epi: x / Bacteria: x        Microbiology: reviewed    Culture - Blood (collected 12-24-23 @ 09:40)  Source: .Blood Blood-Peripheral  Preliminary Report (12-28-23 @ 14:00):    No growth at 4 days    Culture - Blood (collected 12-24-23 @ 09:40)  Source: .Blood Blood-Peripheral  Preliminary Report (12-28-23 @ 14:00):    No growth at 4 days          Radiology: reviewed

## 2023-12-29 NOTE — CASE MANAGEMENT PROGRESS NOTE - NSCMPROGRESSNOTE_GEN_ALL_CORE
Manual standard wheelchair. Patient is unable to ambulate with a cane or rolling walker. Patient needs manual standard wheelchair for ADL's in the home due to    s/p Left  Left TKR on 12/18/23 with DR. Jasmine.  Patient has sufficient upper body strength to self propel, and has a caregiver at home to assist. Use of manual wheelchair will significantly improve the beneficiary's ability to participate in ADL's and the beneficiary will use it on a regular basis in the home. The beneficiary has not expressed an unwillingness to use the manual wheelchair's . Patient requires elevating leg rests due with recent Left TKR admitted for Left Lower Extremity Cellulitis.

## 2023-12-29 NOTE — PROGRESS NOTE ADULT - SUBJECTIVE AND OBJECTIVE BOX
Subjective: Patient seen and examined.  Swelling vastly improved but pain is still out of proportion to physical exam.     MEDICATIONS  (STANDING):  acetaminophen     Tablet .. 1000 milliGRAM(s) Oral every 8 hours  allopurinol 200 milliGRAM(s) Oral daily  amLODIPine   Tablet 5 milliGRAM(s) Oral daily  ceFAZolin   IVPB 2000 milliGRAM(s) IV Intermittent every 8 hours  ceFAZolin   IVPB      celecoxib 200 milliGRAM(s) Oral every 12 hours  enoxaparin Injectable 40 milliGRAM(s) SubCutaneous every 24 hours  lamoTRIgine 200 milliGRAM(s) Oral at bedtime  pantoprazole    Tablet 40 milliGRAM(s) Oral before breakfast    MEDICATIONS  (PRN):  HYDROmorphone  Injectable 0.5 milliGRAM(s) IV Push every 3 hours PRN Breakthrough pain  oxyCODONE    IR 10 milliGRAM(s) Oral every 4 hours PRN Severe Pain (7 - 10)  oxyCODONE    IR 5 milliGRAM(s) Oral every 4 hours PRN for moderate pain      Allergies    No Known Allergies    Intolerances        Vital Signs Last 24 Hrs  T(C): 36.5 (29 Dec 2023 04:46), Max: 36.8 (28 Dec 2023 21:30)  T(F): 97.7 (29 Dec 2023 04:46), Max: 98.2 (28 Dec 2023 21:30)  HR: 64 (29 Dec 2023 04:46) (64 - 67)  BP: 155/77 (29 Dec 2023 04:46) (146/81 - 155/88)  BP(mean): --  RR: 18 (29 Dec 2023 04:46) (16 - 18)  SpO2: 96% (29 Dec 2023 04:46) (94% - 97%)    Parameters below as of 29 Dec 2023 04:46  Patient On (Oxygen Delivery Method): room air        PHYSICAL EXAM:  GENERAL: NAD, well-groomed, well-developed  HEAD:  Atraumatic, Normocephalic  ENMT: Moist mucous membranes,   NECK: Supple, No JVD, Normal thyroid  NERVOUS SYSTEM:  All 4 extremities mobile, no gross sensory deficits.   CHEST/LUNG: Clear to auscultation bilaterally; No rales, rhonchi, wheezing, or rubs  HEART: Regular rate and rhythm; No murmurs, rubs, or gallops  ABDOMEN: Soft, Nontender, Nondistended; Bowel sounds present  EXTREMITIES:  LLE erythema improved; Swelling minimal. Tenderness slight to touch      LABS:                        13.1   7.89  )-----------( 392      ( 29 Dec 2023 08:14 )             38.9     29 Dec 2023 08:14    136    |  100    |  22     ----------------------------<  120    3.8     |  27     |  0.97     Ca    9.6        29 Dec 2023 08:14        Urinalysis Basic - ( 29 Dec 2023 08:14 )    Color: x / Appearance: x / SG: x / pH: x  Gluc: 120 mg/dL / Ketone: x  / Bili: x / Urobili: x   Blood: x / Protein: x / Nitrite: x   Leuk Esterase: x / RBC: x / WBC x   Sq Epi: x / Non Sq Epi: x / Bacteria: x      CAPILLARY BLOOD GLUCOSE          RADIOLOGY & ADDITIONAL TESTS:    Imaging Personally Reviewed:  [ ] YES     Consultant(s) Notes Reviewed:      Care Discussed with Consultants/Other Providers:    Advanced Directives: [ ] DNR  [ ] No feeding tube  [ ] MOLST in chart  [ ] MOLST completed today  [ ] Unknown

## 2023-12-29 NOTE — PROGRESS NOTE ADULT - ASSESSMENT
63M HTN, GOUT, GIRISH, with recent Left TKR admitted for Left Lower Extremity Cellulitis     Left Lower Leg Cellulitis  US negative for DVT but leg still painful; Spoke to Vascular (Dr. Us) and discussed case and results of Arterial studies who did not see anything concerning  Pain continues to be out of proportion and so will pursue a CT lower Extremity with contrast to rule out possible space occupying hematoma or lesion  IV Cefazolin and oral meds on discharge  I suspect portal of entry most likely is related to patient Athletes Foot in both feet  One dose of Oral Fluconazole given   Concern for seeding recent hardware  Consult ID and Orthopedics     Tinea Pedis  Will give one dose of Fluconazole oral   ID Consulted    Aftercare Following S/P Left TKR 12/18/2023  Pain control with Tylenol + Celebrex and Oxycodone  Aspirin BID for DVT Prophylaxis    HTN  Amlodipine    Gout   Allopurinol     Diet  Regular    DVT Prophylaxis  Lovenox    Disposition   Discharge planning pending hospital course

## 2023-12-29 NOTE — DISCHARGE NOTE NURSING/CASE MANAGEMENT/SOCIAL WORK - NSDCPEFALRISK_GEN_ALL_CORE
For information on Fall & Injury Prevention, visit: [email or web address] OR  [email or web address] OR  https://www.cdc.gov/steadi/patient.html For information on Fall & Injury Prevention, visit: [email or web address] OR  [email or web address] OR  https://www.cdc.gov/steadi/patient.html

## 2023-12-29 NOTE — DISCHARGE NOTE NURSING/CASE MANAGEMENT/SOCIAL WORK - NSSCNAMETXT_GEN_ALL_CORE
[facility] care agency [phone] will reach out to you within 24-72 hours of your discharge to schedule home care visit/eval appointment with you. Please call agency for any queries regarding home care services   [facility] care agency [phone] will reach out to you within 24-72 hours of your discharge to schedule home care visit/eval appointment with you. Please call agency for any queries regarding home care services

## 2023-12-29 NOTE — PROGRESS NOTE ADULT - ASSESSMENT
63 M with PMHx of HTN, GOUT, ADHD, GIRISH, B/L knees OA, s/p Left TKR 12/18/2023 with Dr. Jasmine. Patients states he was discharge from the hospital POD1 and was ambulating without any issues, last two days PTA he noted to have his calf begin to hurt and was getting progressively worse.     RECOMMENDATIONS  consistent with nonpurulent cellulitis and knee does not appear to be involved   appreciate ortho recs  MRSA swab neg, but positive S. aureus  BCx NGTD  DVT study negative  ROSEANNE noted; Appreciate vascular recs; no intervention    Recommendations:  C/w cefazolin 2gm IV q8h until 1/2/24  --can switch to PO keflex 500mg QID to complete course when ready for d/c  Pain control as needed  Repeat CT as patient w/ persistent pain out of proportion when trying to weight bear  Monitor for improvement    D/w Dr. Jorge Sargent covering weekend service 12/30-1/1  Infectious Diseases will continue to follow. Please call with any questions.   Fabby Zacarias M.D.  [facility] Division of Infectious Diseases [phone]  For after 5 P.M. and weekends, please call [phone]       63 M with PMHx of HTN, GOUT, ADHD, GIRISH, B/L knees OA, s/p Left TKR 12/18/2023 with Dr. Jasmine. Patients states he was discharge from the hospital POD1 and was ambulating without any issues, last two days PTA he noted to have his calf begin to hurt and was getting progressively worse.     RECOMMENDATIONS  consistent with nonpurulent cellulitis and knee does not appear to be involved   appreciate ortho recs  MRSA swab neg, but positive S. aureus  BCx NGTD  DVT study negative  ROSEANNE noted; Appreciate vascular recs; no intervention    Recommendations:  C/w cefazolin 2gm IV q8h until 1/2/24  --can switch to PO keflex 500mg QID to complete course when ready for d/c  Pain control as needed  Repeat CT as patient w/ persistent pain out of proportion when trying to weight bear  Monitor for improvement    D/w Dr. Jorge Sargent covering weekend service 12/30-1/1  Infectious Diseases will continue to follow. Please call with any questions.   Fabby Zacarias M.D.  [facility] Division of Infectious Diseases [phone]  For after 5 P.M. and weekends, please call [phone]       63 M with PMHx of HTN, GOUT, ADHD, GIRISH, B/L knees OA, s/p Left TKR 12/18/2023 with Dr. Jasmine. Patients states he was discharge from the hospital POD1 and was ambulating without any issues, last two days PTA he noted to have his calf begin to hurt and was getting progressively worse.     RECOMMENDATIONS  consistent with nonpurulent cellulitis and knee does not appear to be involved   appreciate ortho recs  MRSA swab neg, but positive S. aureus  BCx NGTD  DVT study negative  ROSEANNE noted; Appreciate vascular recs; no intervention    Recommendations:  C/w cefazolin 2gm IV q8h until 1/2/24  --can switch to PO keflex 500mg QID to complete course when ready for d/c  Pain control as needed  Repeat CT as patient w/ persistent pain out of proportion when trying to weight bear  Monitor for improvement    D/w Dr. Jorge Hogan covering weekend service 12/30-1/1  Infectious Diseases will continue to follow. Please call with any questions.   Fabby Zacarias M.D.  [facility] Division of Infectious Diseases [phone]  For after 5 P.M. and weekends, please call [phone]     63 M with PMHx of HTN, GOUT, ADHD, GIRISH, B/L knees OA, s/p Left TKR 12/18/2023 with Dr. Jasmine. Patients states he was discharge from the hospital POD1 and was ambulating without any issues, last two days PTA he noted to have his calf begin to hurt and was getting progressively worse.     RECOMMENDATIONS  consistent with nonpurulent cellulitis and knee does not appear to be involved   appreciate ortho recs  MRSA swab neg, but positive S. aureus  BCx NGTD  DVT study negative  ROSEANNE noted; Appreciate vascular recs; no intervention    Recommendations:  C/w cefazolin 2gm IV q8h until 1/2/24  --can switch to PO keflex 500mg QID to complete course when ready for d/c  Pain control as needed  Repeat CT as patient w/ persistent pain out of proportion when trying to weight bear  Monitor for improvement    D/w Dr. Jorge Hogan covering weekend service 12/30-1/1  Infectious Diseases will continue to follow. Please call with any questions.   Fabby Zacarias M.D.  [facility] Division of Infectious Diseases [phone]  For after 5 P.M. and weekends, please call [phone]

## 2023-12-29 NOTE — PROGRESS NOTE ADULT - SUBJECTIVE AND OBJECTIVE BOX
Procedure: S/P Left TKR 12/18/23  Dx: LEft LE Cellulitis & pain    S: Pt without complaints. No SOB,CP, N/V. Tolerated Diet well.   Knee Pain comfortable (2/10 ); Calf pain at rest improved from admission, now 3/10 on  Interval Rx. Redness Calf, Ecchymosis thigh & calf improved from admission. Still with severe pain with standing and walking. Has not walked yet with PT.  On IV Ancef.   [+BM], + flatus, No abdominal pain.    Walked with walker to Bathroom only yest.  Pain Rx:  acetaminophen     Tablet .. 1000 milliGRAM(s) Oral every 8 hours  celecoxib 200 milliGRAM(s) Oral every 12 hours  HYDROmorphone  Injectable 0.5 milliGRAM(s) IV Push every 3 hours PRN  lamoTRIgine 200 milliGRAM(s) Oral at bedtime  oxyCODONE    IR 5 milliGRAM(s) Oral every 4 hours PRN    O: General: On exam, No Apparent Distress  Vital Signs Last 24 Hrs  T(C): 36.5 (29 Dec 2023 04:46), Max: 36.8 (28 Dec 2023 21:30)  T(F): 97.7 (29 Dec 2023 04:46), Max: 98.2 (28 Dec 2023 21:30)  HR: 64 (29 Dec 2023 04:46) (64 - 67)  BP: 155/77 (29 Dec 2023 04:46) (146/81 - 155/88)  RR: 18 (29 Dec 2023 04:46) (16 - 18)  SpO2: 96% (29 Dec 2023 04:46) (94% - 97%)    [Abdomen]: Protuberant;, soft , benign exam   Ext(Knee): Left Knee [Prineo over Incision] clean, dry, & intact,  Upper and lower peeled ends trimmed & reinforced with Steri strips; No knee cellulitis; Min. effusion [ soft ]. Ecchmosis thigh improved;  Erythema calf improved; Minimal soft tissue swelling calf, ankle; [ No fluctuance, No crepitus]. Toe ecchymosis markedly improved - Comparison to Patients's Cell phone photos daily.  ROM: Extension 0 deg. ; Flexion 100 deg. PROM  Neurologic:  Has sensation over feet & toes bilat. Full AROM bilat feet & toes. EHL/AT = 5/5; No hyperesthesia Left  knee to ankle  Vascular: Feet toes warm, pink. DP & PT = 2+. Toe Cap refill 1-2 sec. No calf tenderness bilat..  Urine Out [11P-7A] = Voided    VTEP: On Bilat. Venodynes + enoxaparin Injectable 40 milliGRAM(s) SubCutaneous every 24 hours    Hospitalist input yest. noted.  Inf. Dis. MD input noted.    Labs Today:    CBC:                     13.1   7.89  )-----------( 392      ( 29 Dec 2023 08:14 )             38.9       12-29  Chem:  136  |  100  |  22  ----------------------------<  120<H>  3.8   |  27  |  0.97    Ca    9.6      29 Dec 2023 08:14    Arterial and Venous Dopplers results noted    Primary Orthopedic Assessment:  • Stable from Orthopedic perspective; Cellulitis of calf,  total swelling in calf/foot/toe improving with elevation, thigh ecchymosis improving.    Still with calf pain with standing, though improved subjectively per patient - stood & tolerated for 15 sec. at bedside. Can reattempt PT as tolerated.  • Neuro motor exam LEft  LE stable; Vascular exam of LEft LE with stable distal pulses, Cap refill  • Labs: CBC / Chem stable      Plan:   • Continue:  Left LE elevation /Ice to knee/ Knee ROM      • Continue DVT prophylaxis as prescribed, PAS on hold due to celulitis  • Continue Pain Rx  • Plans per Medicine / Inf. Dis. / Vascular consult awaiting  • Discharge planning – anticipated discharge is Home D/C with Home care & Home PT  when medically stable & cleared by PT/OT   Procedure: S/P Left TKR 12/18/23  Dx: LEft LE Cellulitis & pain    S: Pt without complaints. No SOB,CP, N/V. Tolerated Diet well.   Knee Pain comfortable (2/10 ); Calf pain at rest improved from admission, now 3/10 on  Interval Rx. Redness Calf, Ecchymosis thigh & calf improved from admission. Still with severe pain with standing and walking. Has not walked yet with PT.  On IV Ancef.   [+BM], + flatus, No abdominal pain.    Walked with walker to Bathroom only yest.  Pain Rx:  acetaminophen     Tablet .. 1000 milliGRAM(s) Oral every 8 hours  celecoxib 200 milliGRAM(s) Oral every 12 hours  HYDROmorphone  Injectable 0.5 milliGRAM(s) IV Push every 3 hours PRN  lamoTRIgine 200 milliGRAM(s) Oral at bedtime  oxyCODONE    IR 5 milliGRAM(s) Oral every 4 hours PRN    O: General: On exam, No Apparent Distress  Vital Signs Last 24 Hrs  T(C): 36.5 (29 Dec 2023 04:46), Max: 36.8 (28 Dec 2023 21:30)  T(F): 97.7 (29 Dec 2023 04:46), Max: 98.2 (28 Dec 2023 21:30)  HR: 64 (29 Dec 2023 04:46) (64 - 67)  BP: 155/77 (29 Dec 2023 04:46) (146/81 - 155/88)  RR: 18 (29 Dec 2023 04:46) (16 - 18)  SpO2: 96% (29 Dec 2023 04:46) (94% - 97%)    [Abdomen]: Protuberant;, soft , benign exam   Ext(Knee): Left Knee [Prineo over Incision] clean, dry, & intact,  Upper and lower peeled ends trimmed & reinforced with Steri strips; No knee cellulitis; Min. effusion [ soft ]. Ecchmosis thigh improved;  Erythema calf improved; Minimal soft tissue swelling calf, ankle; [ No fluctuance, No crepitus]. Toe ecchymosis markedly improved - Comparison to Patients's Cell phone photos daily.  ROM: Extension 0 deg. ; Flexion 100 deg. PROM  Neurologic:  Has sensation over feet & toes bilat. Full AROM bilat feet & toes. EHL/AT = 5/5; No hyperesthesia Left  knee to ankle  Vascular: Feet toes warm, pink. DP & PT = 2+. Toe Cap refill 1-2 sec. No calf tenderness bilat..  Urine Out [11P-7A] = Voided    VTEP: On Bilat. Venodynes + enoxaparin Injectable 40 milliGRAM(s) SubCutaneous every 24 hours    Hospitalist input yest. noted.  Inf. Dis. MD input noted.  Vascular Surgery - Dr Us reviewed studies, awaiting to see patient in hospital - he conferred with Hospitalist.    Labs Today:    CBC:                     13.1   7.89  )-----------( 392      ( 29 Dec 2023 08:14 )             38.9       12-29  Chem:  136  |  100  |  22  ----------------------------<  120<H>  3.8   |  27  |  0.97    Ca    9.6      29 Dec 2023 08:14    Arterial and Venous Dopplers results noted    Primary Orthopedic Assessment:  • Stable from Orthopedic perspective; Cellulitis of calf,  total swelling in calf/foot/toe improving with elevation, thigh ecchymosis improving.    Still with calf pain with standing, though improved subjectively per patient - stood & tolerated for 15 sec. at bedside. Can reattempt PT as tolerated.  • Neuro motor exam LEft  LE stable; Vascular exam of LEft LE with stable distal pulses, Cap refill  • Labs: CBC / Chem stable      Plan:   • Continue:  Left LE elevation /Ice to knee/ Knee ROM      • Continue DVT prophylaxis as prescribed, PAS on hold due to celulitis  • Continue Pain Rx  • Plans per Medicine / Inf. Dis. / Vascular consult awaiting  • Discharge planning – anticipated discharge is Home D/C with Home care & Home PT  when medically stable & cleared by PT/OT

## 2023-12-30 LAB
ANION GAP SERPL CALC-SCNC: 5 MMOL/L — SIGNIFICANT CHANGE UP (ref 5–17)
ANION GAP SERPL CALC-SCNC: 5 MMOL/L — SIGNIFICANT CHANGE UP (ref 5–17)
BUN SERPL-MCNC: 21 MG/DL — SIGNIFICANT CHANGE UP (ref 7–23)
BUN SERPL-MCNC: 21 MG/DL — SIGNIFICANT CHANGE UP (ref 7–23)
CALCIUM SERPL-MCNC: 9.5 MG/DL — SIGNIFICANT CHANGE UP (ref 8.4–10.5)
CALCIUM SERPL-MCNC: 9.5 MG/DL — SIGNIFICANT CHANGE UP (ref 8.4–10.5)
CHLORIDE SERPL-SCNC: 99 MMOL/L — SIGNIFICANT CHANGE UP (ref 96–108)
CHLORIDE SERPL-SCNC: 99 MMOL/L — SIGNIFICANT CHANGE UP (ref 96–108)
CO2 SERPL-SCNC: 30 MMOL/L — SIGNIFICANT CHANGE UP (ref 22–31)
CO2 SERPL-SCNC: 30 MMOL/L — SIGNIFICANT CHANGE UP (ref 22–31)
CREAT SERPL-MCNC: 1.08 MG/DL — SIGNIFICANT CHANGE UP (ref 0.5–1.3)
CREAT SERPL-MCNC: 1.08 MG/DL — SIGNIFICANT CHANGE UP (ref 0.5–1.3)
EGFR: 78 ML/MIN/1.73M2 — SIGNIFICANT CHANGE UP
EGFR: 78 ML/MIN/1.73M2 — SIGNIFICANT CHANGE UP
GLUCOSE SERPL-MCNC: 115 MG/DL — HIGH (ref 70–99)
GLUCOSE SERPL-MCNC: 115 MG/DL — HIGH (ref 70–99)
POTASSIUM SERPL-MCNC: 4.5 MMOL/L — SIGNIFICANT CHANGE UP (ref 3.5–5.3)
POTASSIUM SERPL-MCNC: 4.5 MMOL/L — SIGNIFICANT CHANGE UP (ref 3.5–5.3)
POTASSIUM SERPL-SCNC: 4.5 MMOL/L — SIGNIFICANT CHANGE UP (ref 3.5–5.3)
POTASSIUM SERPL-SCNC: 4.5 MMOL/L — SIGNIFICANT CHANGE UP (ref 3.5–5.3)
SODIUM SERPL-SCNC: 134 MMOL/L — LOW (ref 135–145)
SODIUM SERPL-SCNC: 134 MMOL/L — LOW (ref 135–145)

## 2023-12-30 PROCEDURE — 99233 SBSQ HOSP IP/OBS HIGH 50: CPT

## 2023-12-30 PROCEDURE — 99221 1ST HOSP IP/OBS SF/LOW 40: CPT

## 2023-12-30 RX ADMIN — Medication 100 MILLIGRAM(S): at 14:16

## 2023-12-30 RX ADMIN — Medication 1000 MILLIGRAM(S): at 21:14

## 2023-12-30 RX ADMIN — OXYCODONE HYDROCHLORIDE 5 MILLIGRAM(S): 5 TABLET ORAL at 02:45

## 2023-12-30 RX ADMIN — Medication 1000 MILLIGRAM(S): at 15:23

## 2023-12-30 RX ADMIN — CELECOXIB 200 MILLIGRAM(S): 200 CAPSULE ORAL at 21:50

## 2023-12-30 RX ADMIN — Medication 1000 MILLIGRAM(S): at 21:50

## 2023-12-30 RX ADMIN — CELECOXIB 200 MILLIGRAM(S): 200 CAPSULE ORAL at 21:14

## 2023-12-30 RX ADMIN — Medication 1000 MILLIGRAM(S): at 06:18

## 2023-12-30 RX ADMIN — Medication 1000 MILLIGRAM(S): at 14:17

## 2023-12-30 RX ADMIN — PANTOPRAZOLE SODIUM 40 MILLIGRAM(S): 20 TABLET, DELAYED RELEASE ORAL at 05:49

## 2023-12-30 RX ADMIN — ENOXAPARIN SODIUM 40 MILLIGRAM(S): 100 INJECTION SUBCUTANEOUS at 21:13

## 2023-12-30 RX ADMIN — Medication 100 MILLIGRAM(S): at 21:14

## 2023-12-30 RX ADMIN — Medication 1000 MILLIGRAM(S): at 05:48

## 2023-12-30 RX ADMIN — AMLODIPINE BESYLATE 5 MILLIGRAM(S): 2.5 TABLET ORAL at 05:48

## 2023-12-30 RX ADMIN — CELECOXIB 200 MILLIGRAM(S): 200 CAPSULE ORAL at 08:29

## 2023-12-30 RX ADMIN — LAMOTRIGINE 200 MILLIGRAM(S): 25 TABLET, ORALLY DISINTEGRATING ORAL at 21:14

## 2023-12-30 RX ADMIN — OXYCODONE HYDROCHLORIDE 5 MILLIGRAM(S): 5 TABLET ORAL at 02:21

## 2023-12-30 RX ADMIN — Medication 100 MILLIGRAM(S): at 05:48

## 2023-12-30 RX ADMIN — Medication 200 MILLIGRAM(S): at 11:43

## 2023-12-30 RX ADMIN — CELECOXIB 200 MILLIGRAM(S): 200 CAPSULE ORAL at 09:30

## 2023-12-30 NOTE — CONSULT NOTE ADULT - SUBJECTIVE AND OBJECTIVE BOX
Patient s/p left knee replacement with subsequent cellulitis. On antibiotics negative blood cultures. Reports pain with standing on left leg. No pain at rest. Negative DVT study and arterial duplex on exam palpable pedal pulses. No vascular issues.

## 2023-12-30 NOTE — PROGRESS NOTE ADULT - SUBJECTIVE AND OBJECTIVE BOX
Subjective: Patient seen and examined.  CT results reviewed. He is still complaining of pain in Left Lower Extremity.     MEDICATIONS  (STANDING):  acetaminophen     Tablet .. 1000 milliGRAM(s) Oral every 8 hours  allopurinol 200 milliGRAM(s) Oral daily  amLODIPine   Tablet 5 milliGRAM(s) Oral daily  ceFAZolin   IVPB 2000 milliGRAM(s) IV Intermittent every 8 hours  ceFAZolin   IVPB      celecoxib 200 milliGRAM(s) Oral every 12 hours  enoxaparin Injectable 40 milliGRAM(s) SubCutaneous every 24 hours  lamoTRIgine 200 milliGRAM(s) Oral at bedtime  pantoprazole    Tablet 40 milliGRAM(s) Oral before breakfast    MEDICATIONS  (PRN):  HYDROmorphone  Injectable 0.5 milliGRAM(s) IV Push every 3 hours PRN Breakthrough pain  oxyCODONE    IR 5 milliGRAM(s) Oral every 4 hours PRN for moderate pain  oxyCODONE    IR 10 milliGRAM(s) Oral every 4 hours PRN Severe Pain (7 - 10)      Allergies    No Known Allergies    Intolerances        Vital Signs Last 24 Hrs  T(C): 36.5 (30 Dec 2023 05:33), Max: 36.5 (30 Dec 2023 05:33)  T(F): 97.7 (30 Dec 2023 05:33), Max: 97.7 (30 Dec 2023 05:33)  HR: 60 (30 Dec 2023 05:33) (60 - 69)  BP: 143/92 (30 Dec 2023 05:33) (143/92 - 145/76)  BP(mean): --  RR: 18 (30 Dec 2023 05:33) (18 - 18)  SpO2: 94% (30 Dec 2023 05:33) (94% - 95%)    Parameters below as of 30 Dec 2023 05:33  Patient On (Oxygen Delivery Method): room air        PHYSICAL EXAM:  GENERAL: NAD, well-groomed, well-developed  HEAD:  Atraumatic, Normocephalic  ENMT: Moist mucous membranes,   NECK: Supple, No JVD, Normal thyroid  NERVOUS SYSTEM:  All 4 extremities mobile, no gross sensory deficits.   CHEST/LUNG: Clear to auscultation bilaterally; No rales, rhonchi, wheezing, or rubs  HEART: Regular rate and rhythm; No murmurs, rubs, or gallops  ABDOMEN: Soft, Nontender, Nondistended; Bowel sounds present  EXTREMITIES:  2+ Peripheral Pulses, No clubbing, cyanosis, or edema      LABS:    30 Dec 2023 07:49    134    |  99     |  21     ----------------------------<  115    4.5     |  30     |  1.08     Ca    9.5        30 Dec 2023 07:49        Urinalysis Basic - ( 30 Dec 2023 07:49 )    Color: x / Appearance: x / SG: x / pH: x  Gluc: 115 mg/dL / Ketone: x  / Bili: x / Urobili: x   Blood: x / Protein: x / Nitrite: x   Leuk Esterase: x / RBC: x / WBC x   Sq Epi: x / Non Sq Epi: x / Bacteria: x      CAPILLARY BLOOD GLUCOSE          RADIOLOGY & ADDITIONAL TESTS:    Imaging Personally Reviewed:  [ ] YES     Consultant(s) Notes Reviewed:      Care Discussed with Consultants/Other Providers:    Advanced Directives: [ ] DNR  [ ] No feeding tube  [ ] MOLST in chart  [ ] MOLST completed today  [ ] Unknown

## 2023-12-30 NOTE — CONSULT NOTE ADULT - REASON FOR ADMISSION
----- Message from [address] [facility] sent at 3/12/2021  4:14 PM EST -----  Please call patient with results  He can start aleve twice daily with food  Rest and ice  Follow up 1 week if symptoms are not improved
Leg Redness and Swelling
Leg Redness and Swelling

## 2023-12-30 NOTE — OCCUPATIONAL THERAPY INITIAL EVALUATION ADULT - TRANSFER TRAINING, PT EVAL
PCP:  Arnold Arana MD    Last appt: [unfilled]  Future Appointments   Date Time Provider Jaden Martinez   6/23/2023 10:30 AM IO LAB VISIT [facility]   6/26/2023  8:30 AM Azeb Roth MD [facility]   6/27/2023  9:20 AM Cory Campos MD [facility]   11/17/2023 10:20 AM MIKEL Anderson - NP [facility]       Requested Prescriptions     Pending Prescriptions Disp Refills    FLUoxetine (PROZAC) 10 MG capsule [Pharmacy Med Name: FLUOXETINE 10MG CAPSULES] 90 capsule 3     Sig: TAKE 1 CAPSULE BY MOUTH DAILY
Patient will transfer to toilet with DME as needed with mod I  with in 2-3 sessions.

## 2023-12-30 NOTE — DIETITIAN INITIAL EVALUATION ADULT - REASON FOR ADMISSION
Knee pain    Per HPI, "63y M with PMHx of HTN, GOUT, ADHD, GIRISH, B/L knees OA, s/p Left TKR 12/18/2023 with Dr. Jasmine. Patients states he was discharge from the hospital POD1 and was ambulating without any issues, last two days he noted to have his calf begin to hurt and was getting progressively worse. Today his calf and foot was hurting severely and he wasn't able to put weight on the leg. He Denies any trauma, fall, fever chills, numbness, weakness.  (24 Dec 2023 15:35)"

## 2023-12-30 NOTE — DIETITIAN INITIAL EVALUATION ADULT - NUTRITIONGOAL OUTCOME1
Pt to achieve BMI of 30 by achieving slow/steady wt loss of 1-2lb/week Pt to achieve BMI of 30 by achieving slow/steady wt loss of 1-2lb/week  Pt to teachback 3 aspects of healthful weight loss techniques discussed

## 2023-12-30 NOTE — PROGRESS NOTE ADULT - SUBJECTIVE AND OBJECTIVE BOX
Cellulitis LLE S/P: Left TKR                       SUBJECTIVE: Patient seen and examined.  Improving, but still with pain when standing  Reported Pain Score = 6-10 with standing    OBJECTIVE:     Vital Signs Last 24 Hrs  T(C): 36.5 (30 Dec 2023 05:33), Max: 36.7 (29 Dec 2023 13:42)  T(F): 97.7 (30 Dec 2023 05:33), Max: 98.1 (29 Dec 2023 13:42)  HR: 60 (30 Dec 2023 05:33) (60 - 70)  BP: 143/92 (30 Dec 2023 05:33) (143/92 - 158/78)  RR: 18 (30 Dec 2023 05:33) (17 - 18)  SpO2: 94% (30 Dec 2023 05:33) (94% - 97%)    Parameters below as of 30 Dec 2023 05:33  Patient On (Oxygen Delivery Method): room air        Left Knee:         incision clean/dry/intact, prineo tape & steri strips in place          Left LE: ecchymosis and erythema resolving         Good mobility in knee with ROM and no pain         Sensation:  intact to light touch          Motor exam:  5/5 dorsiflexion/plantarflexion/EHL          2+ DP pulses          calf supple, NT         Pain with standing that is relieved with massage  LABS:                        13.1   7.89  )-----------( 392      ( 29 Dec 2023 08:14 )             38.9     12-30    134<L>  |  99  |  21  ----------------------------<  115<H>  4.5   |  30  |  1.08    Ca    9.5      30 Dec 2023 07:49        Urinalysis Basic - ( 30 Dec 2023 07:49 )    Color: x / Appearance: x / SG: x / pH: x  Gluc: 115 mg/dL / Ketone: x  / Bili: x / Urobili: x   Blood: x / Protein: x / Nitrite: x   Leuk Esterase: x / RBC: x / WBC x   Sq Epi: x / Non Sq Epi: x / Bacteria: x        MEDICATIONS:  Anticoagulation:  enoxaparin Injectable 40 milliGRAM(s) SubCutaneous every 24 hours      Pain medications:   acetaminophen     Tablet .. 1000 milliGRAM(s) Oral every 8 hours  celecoxib 200 milliGRAM(s) Oral every 12 hours  HYDROmorphone  Injectable 0.5 milliGRAM(s) IV Push every 3 hours PRN  lamoTRIgine 200 milliGRAM(s) Oral at bedtime  oxyCODONE    IR 5 milliGRAM(s) Oral every 4 hours PRN  oxyCODONE    IR 10 milliGRAM(s) Oral every 4 hours PRN        A/P : cellulitis improving s/p Left TKR  -    compression hose  -    consider lumbar radiculopathy as contributor  -    Pain control  -    DVT ppx: Lovenox 40 SQ daily  -    Weight bearing status: WBAT   -    Physical Therapy  -    Occupational Therapy  -    Discharge plan home pending PT, OT, medical clearance

## 2023-12-30 NOTE — DIETITIAN INITIAL EVALUATION ADULT - ADD RECOMMEND
1) Continue current diet and honor food preferences as able  2) Provided diet edu for protein intake for healing 1) Continue current diet and honor food preferences as able  2) Provided diet edu for protein intake for healing and wt loss

## 2023-12-30 NOTE — DIETITIAN INITIAL EVALUATION ADULT - PERTINENT MEDS FT
MEDICATIONS  (STANDING):  acetaminophen     Tablet .. 1000 milliGRAM(s) Oral every 8 hours  allopurinol 200 milliGRAM(s) Oral daily  amLODIPine   Tablet 5 milliGRAM(s) Oral daily  ceFAZolin   IVPB      ceFAZolin   IVPB 2000 milliGRAM(s) IV Intermittent every 8 hours  celecoxib 200 milliGRAM(s) Oral every 12 hours  enoxaparin Injectable 40 milliGRAM(s) SubCutaneous every 24 hours  lamoTRIgine 200 milliGRAM(s) Oral at bedtime  pantoprazole    Tablet 40 milliGRAM(s) Oral before breakfast    MEDICATIONS  (PRN):  HYDROmorphone  Injectable 0.5 milliGRAM(s) IV Push every 3 hours PRN Breakthrough pain  oxyCODONE    IR 5 milliGRAM(s) Oral every 4 hours PRN for moderate pain  oxyCODONE    IR 10 milliGRAM(s) Oral every 4 hours PRN Severe Pain (7 - 10)

## 2023-12-30 NOTE — DIETITIAN INITIAL EVALUATION ADULT - ORAL INTAKE PTA/DIET HISTORY
Pt states following a regular diet PTA and has weight fluctuations depending on his food intake, and he knows when to consume less if his weight is increased; pt states UBW 270lb but had lost 60lb and was 207lb in March 2023. He slowly has put weight back on but does not know his UBW from just PTA. Pt is very active PTA, doing jiu jitsu and tennis 4-5 days per week.

## 2023-12-30 NOTE — OCCUPATIONAL THERAPY INITIAL EVALUATION ADULT - ADDITIONAL COMMENTS
Lives with wife in a home with 2 steps to enter, multiple sets of stairs inside. Stall shower. Has Rw and commode

## 2023-12-30 NOTE — DIETITIAN INITIAL EVALUATION ADULT - OTHER INFO
Visited patient in room, pt able to answer questions. presents with excellent appetite/po intake, consuming 75>% of meals. Denies n/v/d/c, stooling normally . No reported difficulty chewing or swallowing. NKFA. Reported UBW #, current adm weight 107.7g, weight appears to be stable, will continue to monitor weight trends as able.    Pertinent labs/meds reviewed; mild hyponatremia today, A1c 5.9%; Education provided at this time on prioritizing intake of protein at meals to help with osteo healing. RD to remain available per protocol.  Visited patient in room, pt able to answer questions. Presents with excellent appetite/po intake, consuming 75>% of meals. Denies n/v/d/c, stooling normally. No reported difficulty chewing or swallowing. NKFA. Current adm weight 107.7kg (237lb), weight appears to be stable, will continue to monitor weight trends as able.    Pertinent labs/meds reviewed; mild hyponatremia today, A1c 5.9%; Education provided at this time on prioritizing intake of protein at meals to help with osteo healing. Discussed pt concerns with diet for weight loss (keto vs intermittent fasting vs calorie restriction) and discussed healthful weight loss methods (1-2lb per week, 500kcal reduction daily, consuming calories from all macronutrients). RD to remain available per protocol.

## 2023-12-30 NOTE — OCCUPATIONAL THERAPY INITIAL EVALUATION ADULT - LOWER BODY DRESSING, PREVIOUS LEVEL OF FUNCTION, OT EVAL
[phone] (M)  [phone] (W)  [phone] (H)    Rosalio Salter MD  Internal Medicine    Payor: [employer] / Plan: [employer] PREFERRED HPN [id number] / Product Type: PPO MISC    From: Mio Andre MD  Sent: 1/31/2018   9:37 AM    RIGHT TKA  [id number]  Surgery scheduling requirements include:  Date of Surgery: Elective- anytime  Facility: [facility]  Admission Type: Admit to inpatient  Time Needed: 100 min  Anesthesia: Femoral OR Adductor Nerve Block (SINGLE SHOT) and General  Surgical Assist: yes, surgical assist and yes, GALINA OR STEPHEN  Special Equipment: Biomet Primary Knee  Nickel Allergy?: PLEASE ASK    Consent: At Facility  Blood Donation: none  Pathway class: yes  PREHAB needed? yes    Preop history and physical: Yes, with patient's PCP  Preop testing: CBC, BMP, CXR, EKG, INR and Pre Surgical Staph Aureus Screen with MRSA if Indicated (nasal for TKA, nasal and groin for LAYLA) ([id number] orderable)  Special preop instructions: Bilateral TEDS and SCD's, shave operative site preop, capped IV.  Preop antibiotics: If <150# Ancef 1 gm IV, if >150# Ancef 2 gm IV less than 30 minutes before surgery. If patient has allergy to PCN, then give Vancomycin 1 gram IVPB over 60 minutes (not more than 90 minutes prior to surgery)  Service to Anticoagulation Clinic needed postop: Yes     Schedule postop appointment for 2 weeks postop.     independent

## 2023-12-30 NOTE — DIETITIAN INITIAL EVALUATION ADULT - PERTINENT LABORATORY DATA
12-30    134<L>  |  99  |  21  ----------------------------<  115<H>  4.5   |  30  |  1.08    Ca    9.5      30 Dec 2023 07:49    A1C with Estimated Average Glucose Result: 5.9 % (12-01-23 @ 08:16)

## 2023-12-31 PROCEDURE — 99232 SBSQ HOSP IP/OBS MODERATE 35: CPT

## 2023-12-31 RX ADMIN — Medication 1000 MILLIGRAM(S): at 15:06

## 2023-12-31 RX ADMIN — Medication 100 MILLIGRAM(S): at 21:24

## 2023-12-31 RX ADMIN — ENOXAPARIN SODIUM 40 MILLIGRAM(S): 100 INJECTION SUBCUTANEOUS at 21:24

## 2023-12-31 RX ADMIN — CELECOXIB 200 MILLIGRAM(S): 200 CAPSULE ORAL at 22:00

## 2023-12-31 RX ADMIN — PANTOPRAZOLE SODIUM 40 MILLIGRAM(S): 20 TABLET, DELAYED RELEASE ORAL at 05:32

## 2023-12-31 RX ADMIN — AMLODIPINE BESYLATE 5 MILLIGRAM(S): 2.5 TABLET ORAL at 05:32

## 2023-12-31 RX ADMIN — CELECOXIB 200 MILLIGRAM(S): 200 CAPSULE ORAL at 09:59

## 2023-12-31 RX ADMIN — LAMOTRIGINE 200 MILLIGRAM(S): 25 TABLET, ORALLY DISINTEGRATING ORAL at 21:24

## 2023-12-31 RX ADMIN — Medication 1000 MILLIGRAM(S): at 06:10

## 2023-12-31 RX ADMIN — Medication 1000 MILLIGRAM(S): at 05:32

## 2023-12-31 RX ADMIN — Medication 100 MILLIGRAM(S): at 05:32

## 2023-12-31 RX ADMIN — Medication 1000 MILLIGRAM(S): at 22:00

## 2023-12-31 RX ADMIN — OXYCODONE HYDROCHLORIDE 5 MILLIGRAM(S): 5 TABLET ORAL at 03:37

## 2023-12-31 RX ADMIN — CELECOXIB 200 MILLIGRAM(S): 200 CAPSULE ORAL at 21:37

## 2023-12-31 RX ADMIN — Medication 200 MILLIGRAM(S): at 11:21

## 2023-12-31 RX ADMIN — OXYCODONE HYDROCHLORIDE 5 MILLIGRAM(S): 5 TABLET ORAL at 03:07

## 2023-12-31 RX ADMIN — CELECOXIB 200 MILLIGRAM(S): 200 CAPSULE ORAL at 09:00

## 2023-12-31 RX ADMIN — Medication 1000 MILLIGRAM(S): at 14:36

## 2023-12-31 RX ADMIN — Medication 100 MILLIGRAM(S): at 14:36

## 2023-12-31 RX ADMIN — Medication 1000 MILLIGRAM(S): at 21:24

## 2023-12-31 NOTE — PROGRESS NOTE ADULT - ASSESSMENT
63M HTN, GOUT, GIRISH, with recent Left TKR admitted for Left Lower Extremity Cellulitis     Left Lower Leg Cellulitis  US negative for DVT but leg still painful; Spoke to Vascular (Dr. Us) and discussed case and results of Arterial studies who did not see anything concerning  CT results were negative for any space occupying lesions; Seeding of hardware less likely   At this point treatment extensive workup and consults obtained; Management centered around pain control and encouraging ambulation; May need short term rehab   Orthopedics consulted and agrees; Dr. Jasmine aware   IV Cefazolin and oral meds on discharge  I suspect portal of entry most likely is related to patient Athletes Foot in both feet  One dose of Oral Fluconazole given   Consult ID and Orthopedics     Tinea Pedis  Revieved one dose of Fluconazole oral   ID Consulted    Aftercare Following S/P Left TKR 12/18/2023  Pain control with Tylenol + Celebrex and Oxycodone  Aspirin BID for DVT Prophylaxis    HTN  Amlodipine    Gout   Allopurinol     Diet  Regular    DVT Prophylaxis  Lovenox    Disposition   Discharge planning pending hospital course

## 2023-12-31 NOTE — PROGRESS NOTE ADULT - SUBJECTIVE AND OBJECTIVE BOX
Subjective: Patient seen and examined.  No overnight events.  Pain persistent but overall improved.       MEDICATIONS  (STANDING):  acetaminophen     Tablet .. 1000 milliGRAM(s) Oral every 8 hours  allopurinol 200 milliGRAM(s) Oral daily  amLODIPine   Tablet 5 milliGRAM(s) Oral daily  ceFAZolin   IVPB      ceFAZolin   IVPB 2000 milliGRAM(s) IV Intermittent every 8 hours  celecoxib 200 milliGRAM(s) Oral every 12 hours  enoxaparin Injectable 40 milliGRAM(s) SubCutaneous every 24 hours  lamoTRIgine 200 milliGRAM(s) Oral at bedtime  pantoprazole    Tablet 40 milliGRAM(s) Oral before breakfast    MEDICATIONS  (PRN):  HYDROmorphone  Injectable 0.5 milliGRAM(s) IV Push every 3 hours PRN Breakthrough pain  oxyCODONE    IR 10 milliGRAM(s) Oral every 4 hours PRN Severe Pain (7 - 10)  oxyCODONE    IR 5 milliGRAM(s) Oral every 4 hours PRN for moderate pain      Allergies    No Known Allergies    Intolerances        Vital Signs Last 24 Hrs  T(C): 36.6 (31 Dec 2023 05:07), Max: 37.1 (30 Dec 2023 17:40)  T(F): 97.9 (31 Dec 2023 05:07), Max: 98.8 (30 Dec 2023 17:40)  HR: 66 (31 Dec 2023 05:07) (66 - 73)  BP: 145/88 (31 Dec 2023 05:07) (140/86 - 157/94)  BP(mean): --  RR: 18 (31 Dec 2023 05:07) (18 - 19)  SpO2: 94% (31 Dec 2023 05:07) (94% - 97%)    Parameters below as of 31 Dec 2023 05:07  Patient On (Oxygen Delivery Method): room air        PHYSICAL EXAM:  GENERAL: NAD, well-groomed, well-developed  HEAD:  Atraumatic, Normocephalic  ENMT: Moist mucous membranes,   NECK: Supple, No JVD, Normal thyroid  NERVOUS SYSTEM:  All 4 extremities mobile, no gross sensory deficits.   CHEST/LUNG: Clear to auscultation bilaterally; No rales, rhonchi, wheezing, or rubs  HEART: Regular rate and rhythm; No murmurs, rubs, or gallops  ABDOMEN: Soft, Nontender, Nondistended; Bowel sounds present  EXTREMITIES:  LLE ERythema and swelling improved; Non tender to touch.       LABS:      Ca    9.5        30 Dec 2023 07:49        Urinalysis Basic - ( 30 Dec 2023 07:49 )    Color: x / Appearance: x / SG: x / pH: x  Gluc: 115 mg/dL / Ketone: x  / Bili: x / Urobili: x   Blood: x / Protein: x / Nitrite: x   Leuk Esterase: x / RBC: x / WBC x   Sq Epi: x / Non Sq Epi: x / Bacteria: x      CAPILLARY BLOOD GLUCOSE          RADIOLOGY & ADDITIONAL TESTS:    Imaging Personally Reviewed:  [ ] YES     Consultant(s) Notes Reviewed:      Care Discussed with Consultants/Other Providers:    Advanced Directives: [ ] DNR  [ ] No feeding tube  [ ] MOLST in chart  [ ] MOLST completed today  [ ] Unknown

## 2023-12-31 NOTE — PROGRESS NOTE ADULT - SUBJECTIVE AND OBJECTIVE BOX
ORTHOPEDIC PA PROGRESS NOTE  ELIZABETH BAEZ      63y Male                                 SY 1EST 114 W1                                                                                                                           POD #    7d    STATUS POST:       Procedure:      Left TKR on 12/18/23      Patient seen and examined at bedside.  Pt states swelling and pain has decrease over past few days    Current Pain Management:    acetaminophen     Tablet .. 1000 milliGRAM(s) Oral every 8 hours  celecoxib 200 milliGRAM(s) Oral every 12 hours  HYDROmorphone  Injectable 0.5 milliGRAM(s) IV Push every 3 hours PRN  lamoTRIgine 200 milliGRAM(s) Oral at bedtime  oxyCODONE    IR 10 milliGRAM(s) Oral every 4 hours PRN  oxyCODONE    IR 5 milliGRAM(s) Oral every 4 hours PRN      T(F): 97.9  HR: 66  BP: 145/88  RR: 18  SpO2: 94%               12-30-23 @ 07:01  -  12-31-23 @ 07:00  --------------------------------------------------------  IN:    IV PiggyBack: 100 mL  Total IN: 100 mL    OUT:    Voided (mL): 400 mL  Total OUT: 400 mL    Total NET: -300 mL        Physical Exam :         -   Wound C/D/I, prineo intact. jaylene stockins intact. no erythema/no calor  -   Distal Neurvascular status intact grossly.   -   Warm well perfused; capillary refill <3 seconds   -   (+)EHL/FHL   -   (+) Sensation to light touch  -   (-) Calf tenderness Bilaterally      A/P: 63y Male s/p    -   Ortho Stable  -   Pain control:  acetaminophen     Tablet .. 1000 milliGRAM(s) Oral every 8 hours  acetaminophen     Tablet .. 1000 milliGRAM(s) Oral every 8 hours  acetaminophen   IVPB .. 1000 milliGRAM(s) IV Intermittent once  celecoxib 200 milliGRAM(s) Oral every 12 hours  celecoxib 200 milliGRAM(s) Oral every 12 hours  HYDROmorphone  Injectable 0.5 milliGRAM(s) IV Push every 3 hours PRN  lamoTRIgine 200 milliGRAM(s) Oral at bedtime  lamoTRIgine 200 milliGRAM(s) Oral daily  ondansetron Injectable 4 milliGRAM(s) IV Push every 6 hours PRN  ondansetron Injectable 4 milliGRAM(s) IV Push once PRN  ondansetron Injectable 4 milliGRAM(s) IV Push once PRN  oxyCODONE    IR 5 milliGRAM(s) Oral every 4 hours PRN  oxyCODONE    IR 10 milliGRAM(s) Oral every 4 hours PRN    -   Medicine to follow  -   IV ABX, Elevate LLE  -   DVT ppx:    PAS +  enoxaparin Injectable: 40 milliGRAM(s) SubCutaneous  -   PT/OT OOB,  Weight bearing status: WBAT   -  Dispo:   Home when cleared by PT and Medicine  -   Prescribed Medications:  CeleBREX 200 mg oral capsule: 1 cap(s) orally every 12 hours MDD:2 Take at least 2 hours apart from Ecotrin (Aspirin) MDD: 2  Ecotrin Adult Low Strength 81 mg oral delayed release tablet: 1 tab(s) orally every 12 hours MDD: 2  omeprazole 20 mg oral delayed release capsule: 1 cap(s) orally once a day Take Prior  to a meal/breakfast MDD: 1  oxyCODONE 5 mg oral tablet: 1 tab(s) orally every 4 hours as needed for  moderate pain MDD: 6

## 2024-01-01 ENCOUNTER — TRANSCRIPTION ENCOUNTER (OUTPATIENT)
Age: 64
End: 2024-01-01

## 2024-01-01 VITALS
DIASTOLIC BLOOD PRESSURE: 75 MMHG | SYSTOLIC BLOOD PRESSURE: 145 MMHG | HEART RATE: 65 BPM | OXYGEN SATURATION: 95 % | TEMPERATURE: 98 F | RESPIRATION RATE: 18 BRPM

## 2024-01-01 PROCEDURE — 80053 COMPREHEN METABOLIC PANEL: CPT

## 2024-01-01 PROCEDURE — 96365 THER/PROPH/DIAG IV INF INIT: CPT

## 2024-01-01 PROCEDURE — 85610 PROTHROMBIN TIME: CPT

## 2024-01-01 PROCEDURE — 87040 BLOOD CULTURE FOR BACTERIA: CPT

## 2024-01-01 PROCEDURE — 85025 COMPLETE CBC W/AUTO DIFF WBC: CPT

## 2024-01-01 PROCEDURE — 86803 HEPATITIS C AB TEST: CPT

## 2024-01-01 PROCEDURE — 99285 EMERGENCY DEPT VISIT HI MDM: CPT | Mod: 25

## 2024-01-01 PROCEDURE — 73701 CT LOWER EXTREMITY W/DYE: CPT

## 2024-01-01 PROCEDURE — 99239 HOSP IP/OBS DSCHRG MGMT >30: CPT

## 2024-01-01 PROCEDURE — 85730 THROMBOPLASTIN TIME PARTIAL: CPT

## 2024-01-01 PROCEDURE — 73562 X-RAY EXAM OF KNEE 3: CPT

## 2024-01-01 PROCEDURE — 36415 COLL VENOUS BLD VENIPUNCTURE: CPT

## 2024-01-01 PROCEDURE — 96375 TX/PRO/DX INJ NEW DRUG ADDON: CPT

## 2024-01-01 PROCEDURE — 97161 PT EVAL LOW COMPLEX 20 MIN: CPT

## 2024-01-01 PROCEDURE — 97530 THERAPEUTIC ACTIVITIES: CPT

## 2024-01-01 PROCEDURE — 83605 ASSAY OF LACTIC ACID: CPT

## 2024-01-01 PROCEDURE — 93926 LOWER EXTREMITY STUDY: CPT

## 2024-01-01 PROCEDURE — 97165 OT EVAL LOW COMPLEX 30 MIN: CPT

## 2024-01-01 PROCEDURE — 80048 BASIC METABOLIC PNL TOTAL CA: CPT

## 2024-01-01 PROCEDURE — 97116 GAIT TRAINING THERAPY: CPT

## 2024-01-01 PROCEDURE — 82550 ASSAY OF CK (CPK): CPT

## 2024-01-01 PROCEDURE — 85027 COMPLETE CBC AUTOMATED: CPT

## 2024-01-01 PROCEDURE — 97110 THERAPEUTIC EXERCISES: CPT

## 2024-01-01 PROCEDURE — 93971 EXTREMITY STUDY: CPT

## 2024-01-01 RX ORDER — APIXABAN 2.5 MG/1
1 TABLET, FILM COATED ORAL
Qty: 56 | Refills: 0
Start: 2024-01-01 | End: 2024-01-28

## 2024-01-01 RX ORDER — CEPHALEXIN 500 MG
1 CAPSULE ORAL
Qty: 16 | Refills: 0
Start: 2024-01-01 | End: 2024-01-04

## 2024-01-01 RX ADMIN — OXYCODONE HYDROCHLORIDE 10 MILLIGRAM(S): 5 TABLET ORAL at 03:37

## 2024-01-01 RX ADMIN — Medication 1000 MILLIGRAM(S): at 05:24

## 2024-01-01 RX ADMIN — OXYCODONE HYDROCHLORIDE 10 MILLIGRAM(S): 5 TABLET ORAL at 04:07

## 2024-01-01 RX ADMIN — PANTOPRAZOLE SODIUM 40 MILLIGRAM(S): 20 TABLET, DELAYED RELEASE ORAL at 05:24

## 2024-01-01 RX ADMIN — Medication 1000 MILLIGRAM(S): at 06:00

## 2024-01-01 RX ADMIN — Medication 1000 MILLIGRAM(S): at 13:07

## 2024-01-01 RX ADMIN — CELECOXIB 200 MILLIGRAM(S): 200 CAPSULE ORAL at 10:10

## 2024-01-01 RX ADMIN — Medication 100 MILLIGRAM(S): at 05:25

## 2024-01-01 RX ADMIN — Medication 100 MILLIGRAM(S): at 13:06

## 2024-01-01 RX ADMIN — Medication 200 MILLIGRAM(S): at 11:13

## 2024-01-01 RX ADMIN — CELECOXIB 200 MILLIGRAM(S): 200 CAPSULE ORAL at 09:25

## 2024-01-01 RX ADMIN — AMLODIPINE BESYLATE 5 MILLIGRAM(S): 2.5 TABLET ORAL at 05:24

## 2024-01-01 RX ADMIN — Medication 1000 MILLIGRAM(S): at 14:00

## 2024-01-01 NOTE — DISCHARGE NOTE PROVIDER - NSDCMRMEDTOKEN_GEN_ALL_CORE_FT
acetaminophen 500 mg oral tablet: 2 tab(s) orally every 8 hours  allopurinol 200 mg oral tablet: 1 tab(s) orally once a day  amLODIPine 5 mg oral tablet: 1 tab(s) orally once a day  CeleBREX 200 mg oral capsule: 1 cap(s) orally every 12 hours MDD:2 Take at least 2 hours apart from Ecotrin (Aspirin) MDD: 2  cephalexin 500 mg oral tablet: 1 tab(s) orally 4 times a day  Eliquis 2.5 mg oral tablet: 1 tab(s) orally 2 times a day  lamoTRIgine 200 mg oral tablet, extended release: 1 tab(s) orally once a day  omeprazole 20 mg oral delayed release capsule: 1 cap(s) orally once a day Take Prior  to a meal/breakfast MDD: 1  oxyCODONE 5 mg oral tablet: 1 tab(s) orally every 4 hours as needed for  moderate pain MDD: 6

## 2024-01-01 NOTE — DISCHARGE NOTE PROVIDER - NSDCFUSCHEDAPPT_GEN_ALL_CORE_FT
Destiny Husain  [facility]  ORTHOSURG [address] [facility]  Scheduled Appointment: 01/04/2024    Kunal Jasmine  [facility]  ORTHOSUR[address]  Scheduled Appointment: 02/13/2024     Destiny Husain  [facility]  ORTHOSURG [address] [facility]  Scheduled Appointment: 01/04/2024    Kunal Jasmine  [facility]  ORTHOSUR[address]  Scheduled Appointment: 02/13/2024

## 2024-01-01 NOTE — DISCHARGE NOTE PROVIDER - NSDCCPCAREPLAN_GEN_ALL_CORE_FT
PRINCIPAL DISCHARGE DIAGNOSIS  Diagnosis: Cellulitis  Assessment and Plan of Treatment: You were diagnosed with a lower extremity skin infection.  Infection was worked up extenisively to rule out any deeper seeded infection as well as infection of hardware. Consultation with Orthopedics, Infectious Disease and Vascular Surgery was obtained. Please complete course of antibiotics as prescribed and follow up with Infectious Disease in 1 week.  Contact information has been provided.   We have upgraded your DVT prophylaxis from Aspirin to Eliquis.  Prescriptions for antibiotics and Eliquis has been sent to your pharmacy.

## 2024-01-01 NOTE — CASE MANAGEMENT PROGRESS NOTE - NSCMPROGRESSNOTE_GEN_ALL_CORE
Pt was cleared medically by the team for transition home today with GREG with [facility] with a SOC on 1/2/23. DC and F2F sent to [facility]. Pt did well today with PT and ambulated well with the RW. Referral for WC and commode was cancelled as the pt no longer needs the DME. The pt has a RW and cane in the home. The pt has a wife in the home to assist with needs and a friend will transport him home this afternoon. The bedside RN is aware of the plan of care for discharge. CM team to remain available for post acute needs.  Pt was cleared medically by the team for transition home today with GREG with [facility] with a SOC on 1/2/23. DC and F2F sent to [facility]. Pt did well today with PT and ambulated well with the RW. Referral for WC and commode was cancelled as the pt no longer needs the DME. The pt has a RW and cane in the home. The pt has a wife in the home to assist with needs and a friend will transport him home this afternoon. The bedside RN is aware of the plan of care for discharge. CM team to remain available for post acute needs.

## 2024-01-01 NOTE — PROGRESS NOTE ADULT - SUBJECTIVE AND OBJECTIVE BOX
S/P: Left TKR on 12/18/2023 presenting with LLE cellulitis       SUBJECTIVE: Patient seen and examined at bedside.  Pt states swelling and pain has decreased over past few days and is now able to stand and walk with assistance of walker. Denies nausea, vomiting, diarrhea, chest pain, shortness of breath, headache, dizziness. Patient states desire to be discharged home today.  Reported Pain Score = 4/10    OBJECTIVE:     Vital Signs Last 24 Hrs  T(C): 36.8 (01 Jan 2024 05:31), Max: 36.8 (31 Dec 2023 22:35)  T(F): 98.2 (01 Jan 2024 05:31), Max: 98.3 (31 Dec 2023 22:35)  HR: 59 (01 Jan 2024 05:31) (59 - 69)  BP: 139/82 (01 Jan 2024 05:31) (139/82 - 160/93)  BP(mean): --  RR: 18 (01 Jan 2024 05:31) (18 - 18)  SpO2: 95% (01 Jan 2024 05:31) (95% - 96%)    Parameters below as of 01 Jan 2024 05:31  Patient On (Oxygen Delivery Method): room air      Left Knee:          Incision: clean/dry/intact; steristrips intact; swelling improved, only slightly more swollen than RLE  Bilateral LEs:         Sensation:  intact to light touch          Motor exam:  5/5 dorsiflexion/plantarflexion/EHL          2+ DP and PT pulses          calf supple, NT         SCDs in place    LABS:    MEDICATIONS:  Anticoagulation:  enoxaparin Injectable 40 milliGRAM(s) SubCutaneous every 24 hours      Pain medications:   acetaminophen     Tablet .. 1000 milliGRAM(s) Oral every 8 hours  celecoxib 200 milliGRAM(s) Oral every 12 hours  HYDROmorphone  Injectable 0.5 milliGRAM(s) IV Push every 3 hours PRN  lamoTRIgine 200 milliGRAM(s) Oral at bedtime  oxyCODONE    IR 10 milliGRAM(s) Oral every 4 hours PRN    A/P: s/p Left TKR on 12/18/2023 presenting with LLE cellulitis  -    Pain control  -    DVT ppx  -    Weight bearing status: WBAT LLE  -    Physical Therapy  -    Occupational Therapy  -    Discharge plan:  patient is orthopedically stable for discharge

## 2024-01-01 NOTE — DISCHARGE NOTE PROVIDER - CARE PROVIDER_API CALL
Fabby Zacarias  Infectious Disease  [address] [place] [address], [facility] [address]  [place] [address]  Phone: [phone]  Fax: [phone]  Follow Up Time:    Fabby Zacarias  Infectious Disease  [address] [place] [address], [facility] [address]  [place] [address]  Phone: [phone]  Fax: [phone]  Follow Up Time:

## 2024-01-01 NOTE — DISCHARGE NOTE PROVIDER - HOSPITAL COURSE
63M HTN, GOUT, GIRISH, with recent Left TKR admitted for Left Lower Extremity Cellulitis     Left Lower Leg Cellulitis  US negative for DVT; Spoke to Vascular (Dr. Us) and discussed case and results of Arterial studies who did not see anything concerning  CT results were negative for any space occupying lesions; Seeding of hardware less likely   At this point treatment extensive workup and consults obtained; Management centered around pain control and encouraging ambulation; May need short term rehab   Orthopedics consulted and agrees; Dr. Jasmine aware   Patient did good with pain control and switched to oral medications   I suspect portal of entry most likely is related to patient Athletes Foot in both feet  One dose of Oral Fluconazole given   To follow up with ID outpatient     Tinea Pedis  Revieved one dose of Fluconazole oral     Aftercare Following S/P Left TKR 12/18/2023  Pain control with Tylenol + Celebrex and Oxycodone  Upgraded DVT Prophylaxis to Eliquis BID    HTN  Amlodipine    Gout   Allopurinol

## 2024-01-01 NOTE — PROGRESS NOTE ADULT - REASON FOR ADMISSION
LEft LE Cellulitis; S/P LEft TKR 12/18
Leg Redness and Swelling

## 2024-01-01 NOTE — PROGRESS NOTE ADULT - PROVIDER SPECIALTY LIST ADULT
Hospitalist
Hospitalist
Infectious Disease
Orthopedics
Hospitalist
Infectious Disease
Orthopedics
Hospitalist
Hospitalist
Orthopedics
Hospitalist
Hospitalist
Infectious Disease
Infectious Disease

## 2024-01-04 ENCOUNTER — APPOINTMENT (OUTPATIENT)
Dept: ORTHOPEDIC SURGERY | Facility: CLINIC | Age: 64
End: 2024-01-04
Payer: COMMERCIAL

## 2024-01-04 VITALS
SYSTOLIC BLOOD PRESSURE: 166 MMHG | BODY MASS INDEX: 36.73 KG/M2 | WEIGHT: 248 LBS | HEART RATE: 73 BPM | DIASTOLIC BLOOD PRESSURE: 104 MMHG | HEIGHT: 69 IN

## 2024-01-04 PROCEDURE — 99024 POSTOP FOLLOW-UP VISIT: CPT

## 2024-01-04 PROCEDURE — 73562 X-RAY EXAM OF KNEE 3: CPT | Mod: LT

## 2024-01-04 RX ORDER — AMOXICILLIN 500 MG/1
500 CAPSULE ORAL
Qty: 12 | Refills: 0 | Status: ACTIVE | COMMUNITY
Start: 2024-01-04 | End: 1900-01-01

## 2024-01-04 NOTE — HISTORY OF PRESENT ILLNESS
[___ Weeks Post Op] : [unfilled] weeks post op [3] : the patient reports pain that is 3/10 in severity [Chills] : no chills [Constipation] : no constipation [Diarrhea] : no diarrhea [Dysuria] : no dysuria [Fever] : no fever [Nausea] : no nausea [Vomiting] : no vomiting [Clean/Dry/Intact] : clean, dry and intact [Erythema] : erythematous [Discharge] : absent of discharge [Swelling] : swollen [Dehiscence] : not dehisced [Neuro Intact] : an unremarkable neurological exam [Vascular Intact] : ~T peripheral vascular exam normal [Negative Claritza's] : maneuvers demonstrated a negative Claritza's sign [Xray (Date:___)] : [unfilled] Xray -  [Hardware in Good Position] : hardware in good position [No Obvious Fractures] : no obvious fractures [Good Overall Alignment] : good overall alignment [Doing Well] : is doing well [No Sign of Infection] : is showing no signs of infection [Adequate Pain Control] : has adequate pain control [de-identified] : Left total knee replacement 12/18/2023. The patient is a year male S/P left total knee replacement performed at [facility] on 12/18/2023. Patient presents today for his first post operative visit and Dermabond tape removal. [de-identified] :  The patient was discharged from the hospital with home physical therapy and home exercises. He verbalized feeling well overall. The patient reports pain of 3/10. He is taking Tylenol as needed for pain relief. Patient is using EC. Aspirin 81 MG twice a day for DVT prophylaxis. The patient is using Celebrex to reduce inflammation  Pt was admitted to [facility] on 12/22/23 with left LE erythema and swelling and excruciating pain. He was diagnosed with cellulitis and treated with antibiotics. Pt was dischared on 1/1/23 on keflex. He has one more day of po antibiotics left. US was negative for DVT and CT of LE with no acute findings.  [de-identified] :  The patient has mild antalgic gait utilizing a cane. He is S/P left total knee replacement. The knee is with Dermabond tape intact. The surgical incision site is without redness, heat or drainage. No palpable hematoma or effusion. No calf tenderness. Positive distal pulses. The active ROM of the left knee is from  degrees. No evidence of ligament laxity, muscle atrophy, motor or sensory deficit. No flexion contracture or extension lag noted. The left lower extremity is with mild swelling. There is no evidence of infection or cellulitis on the incision site. [de-identified] :  3 views of the left knee were obtained at today's visit. X-rays reveal a well-positioned, well fixed total knee replacement in good alignment. There is no obvious evidence of fractures, dislocation or osteolysis. [de-identified] : s/p left knee replacement, pt is doing well.  Lt  LE cellulitis [de-identified] :           Dermabond was removed from the left knee and replaced with Steri-Strips. Patient tolerated it well. Incisional care was reviewed with the patient. Patient was advised on the nature of the healing process and on the importance of adhering to the DVT prophylaxis with Aspirin 81 MG BID for a total of 4 weeks post operative. Patient to continue with physical therapy and home exercises as recommended. Prescription was given for outpatient physical therapy. Patient was encouraged to engage in isometric exercises to assist the knee to come to full extension. He was advised to continue to apply ice to the knee and keep the left lower extremity elevated above the level of the heart to decrease swelling. Prescription was given for antibiotics Amoxicillin for dental prophylaxis as per Dr. Jasmine's protocol. He will continue to utilize Tylenol as needed for pain relief and Celebrex to decrease inflammation. Patient to make a follow up appointment to see Dr. Jasmine in 6 weeks. He was educated on the signs and symptoms of infection to report. Patient verbalized understanding of all instructions. All   his questions were addressed to his satisfaction. Patient was advised to call this office if he has new questions or concerns. My cumulative time spent on this patient's visit included: Preparation for the visit, review of the medical records, review of pertinent diagnostic studies, examination and counseling of the patient on the above diagnosis, treatment plan and prognosis, orders of diagnostic tests, medications and/or appropriate procedures and documentation in the medical records of today's visit.  Not including time spent for procedures

## 2024-01-15 RX ORDER — CELECOXIB 200 MG/1
200 CAPSULE ORAL
Qty: 30 | Refills: 0 | Status: ACTIVE | COMMUNITY
Start: 2024-01-15 | End: 1900-01-01

## 2024-02-13 ENCOUNTER — APPOINTMENT (OUTPATIENT)
Dept: ORTHOPEDIC SURGERY | Facility: CLINIC | Age: 64
End: 2024-02-13

## 2024-02-22 ENCOUNTER — APPOINTMENT (OUTPATIENT)
Dept: ORTHOPEDIC SURGERY | Facility: CLINIC | Age: 64
End: 2024-02-22
Payer: COMMERCIAL

## 2024-02-22 VITALS
BODY MASS INDEX: 36.73 KG/M2 | SYSTOLIC BLOOD PRESSURE: 156 MMHG | WEIGHT: 248 LBS | HEIGHT: 69 IN | DIASTOLIC BLOOD PRESSURE: 97 MMHG

## 2024-02-22 DIAGNOSIS — Z96.652 PRESENCE OF LEFT ARTIFICIAL KNEE JOINT: ICD-10-CM

## 2024-02-22 PROCEDURE — 99024 POSTOP FOLLOW-UP VISIT: CPT

## 2024-02-22 NOTE — HISTORY OF PRESENT ILLNESS
[___ Months Post Op] : [unfilled] months post op [Clean/Dry/Intact] : clean, dry and intact [Healed] : healed [Discharge] : absent of discharge [Erythema] : not erythematous [Swelling] : swollen [Dehiscence] : not dehisced [No Obvious Fractures] : no obvious fractures [Hardware in Good Position] : hardware in good position [Good Overall Alignment] : good overall alignment [Doing Well] : is doing well [No Sign of Infection] : is showing no signs of infection [Adequate Pain Control] : has adequate pain control [de-identified] : Left TKR 12/18/23 [de-identified] : The patient is a 63-year-old gentleman who presents today for follow-up of his left knee.  He status post a left total knee replacement done December 18, 2023.  He did have a perioperative complication of being admitted to [facility] on IV antibiotics for lower extremity cellulitis.  This is since resolved.  Patient is ambulating independently.  He is driving.  He is taking only anti-inflammatories on appearing basis for the discomfort. [de-identified] : Left total knee replacement, in anatomical alignment, excellent bone to prosthesis interface, there is no gross evidence of prosthetic failure, all 3 components are perfectly anatomically aligned. [de-identified] : Well-healed surgical scar to the left knee without erythema, drainage, or evidence of cellulitis.  Less than 5 degree laxity with varus valgus stresses.  Negative anterior posterior drawer.  No extension lag.  No flexion contractures.  Range of motion 0-1 35.  Calves are soft, nontender, no evidence of DVT at this time.  Patient has good motor and sensory to both legs.  Minimal effusion [de-identified] : Stable postoperative course of a left total knee replacement [de-identified] : The patient will continue an exercise program of walking, strength training.  He will continue with analgesics ice elevation, local patches and other conservative treatment modalities.  Would like to see him in several months.  All of his questions were answered to his satisfaction.

## 2024-05-28 ENCOUNTER — APPOINTMENT (OUTPATIENT)
Dept: ORTHOPEDIC SURGERY | Facility: CLINIC | Age: 64
End: 2024-05-28
Payer: COMMERCIAL

## 2024-05-28 VITALS — SYSTOLIC BLOOD PRESSURE: 148 MMHG | DIASTOLIC BLOOD PRESSURE: 91 MMHG | HEART RATE: 66 BPM

## 2024-05-28 DIAGNOSIS — Z96.652 PRESENCE OF LEFT ARTIFICIAL KNEE JOINT: ICD-10-CM

## 2024-05-28 DIAGNOSIS — M17.31 UNILATERAL POST-TRAUMATIC OSTEOARTHRITIS, RIGHT KNEE: ICD-10-CM

## 2024-05-28 PROCEDURE — 73562 X-RAY EXAM OF KNEE 3: CPT | Mod: LT,RT

## 2024-05-28 PROCEDURE — 99214 OFFICE O/P EST MOD 30 MIN: CPT

## 2024-05-28 NOTE — PHYSICAL EXAM
[LE] : Sensory: Intact in bilateral lower extremities [DP] : dorsalis pedis 2+ and symmetric bilaterally [Normal RLE] : Right Lower Extremity: No scars, rashes, lesions, ulcers, skin intact [Normal LLE] : Left Lower Extremity: No scars, rashes, lesions, ulcers, skin intact [Normal Touch] : sensation intact for touch [Normal] : no peripheral adenopathy appreciated [de-identified] : B/L Knees: Skin is clean, dry, intact. Swelling: No significant swelling Effusion: No significant effusion Alignment: Neutral alignment Tenderness: None Incision: Well healed surgical incision consistent with ACL reconstruction on the right knee, well healed surgical incision on the left knee consistent with TKR Skin Temp: Warm to touch ROM: Flexion: 130 deg.; Extension: 0 deg, Laxity A-P: Negative anteroposterior drawer Laxity M-L: Less than 5 degree laxity varus valgus stresses PF crepitus: None Sensation intact throughout the distal lower extremity Pulses: 2+ dorsalis pedis pulses Quad/Ham St: 5/5 [de-identified] : Radiographs of the left knee were performed today. There are stable interfaces between bone, cement, and implant in all 3 components. There is stable positioning of the femoral, tibial, and patellar components. There is equidistant spacing on each side of the tibial bearing. There is no fracture, foreign body, subsidement, osteolysis, or notable effusion. The patellar component is tracking centrally in the trochlear groove of the femoral component.  Radiographs of the right knee were performed today in the [place] office. Grade 4 bone-on-bone cartilaginous wear in all 3 compartments. Subchondral sclerosis noted on both sides the T-F  joint. Osteophytes are noted in all three compartments.

## 2024-05-28 NOTE — REASON FOR VISIT
[Follow-Up Visit] : a follow-up visit for [FreeTextEntry2] : Left TKR 12/18/23. Right knee osteoarthritis

## 2024-05-28 NOTE — END OF VISIT
[FreeTextEntry3] : I Dr. Jasmine, personally performed the evaluation and management services for this established patient who present today with a new problem/exacerbation of an existing condition. The E/M includes conducting the examination, assessing all new/exacerbated conditions, and establishing a new plan of care. Today, My ACP was here to assist in my evaluation and management services of this new problem/exacerbated condition to be followed going forward.

## 2024-05-28 NOTE — DISCUSSION/SUMMARY
[de-identified] : Treatment options were reviewed in detail with the patient including conservative vs surgical management. Recommend continuing with conservative management at this time including rest, ice, anti-inflammatories, and warm moist heat as needed. They may continue to participate in daily activities within tolerances. Encouraged good home exercise program to increase strength and ROM.  All patients questions and concerns were addressed to satisfaction. Advised patient to call the office if any new or worsening symptoms arise.

## 2024-05-28 NOTE — HISTORY OF PRESENT ILLNESS
[de-identified] : 63-year-old male presents for follow-up of the left knee status post left total knee replacement 12/18/2023.  Overall the postoperative period patient is doing well.  He states that he has some mild to moderate pain in the left knee as well as pain that is coming up in the right knee.  He states that the right knee pain can be rated as a 3 out of 10.  The pain in the right knee can limit his activities once the pain kicks and usually has to rest to allow it to pass.  He would like to discuss what his options are.  He is not taking any anti-inflammatory pain medications. Pain does not radiate and stay localized to the right knee. He is doing PT once per week. Denies fevers, chills, chest pain, calf pain, shortness of breath. [Stable] : stable [3] : a current pain level of 3/10 [Standing] : standing [Intermit.] : ~He/She~ states the symptoms seem to be intermittent [Walking] : worsened by walking [Knee Flexion] : worsened with knee flexion [Knee Extension] : worsened with knee extension [Rest] : relieved by rest

## 2025-01-29 NOTE — ED PROVIDER NOTE - IMAGING STUDIES QUESTION 2 - PERFORMED INDEPENDENT VISUALIZATION
Patient seen by Dr Lagunas on 1/24/25. Clomipramine refills sent during visit. Request for hydroxyzine sent to Provider for review.  
Yes

## 2025-08-25 ENCOUNTER — APPOINTMENT (OUTPATIENT)
Age: 65
End: 2025-08-25

## (undated) DEVICE — WARMING BLANKET UPPER ADULT

## (undated) DEVICE — DRSG PICO NPWT 4X12"

## (undated) DEVICE — SUT VICRYL PLUS 2-0 27" CP-1 UNDYED

## (undated) DEVICE — WOUND IRR IRRISEPT W 0.5 CHG

## (undated) DEVICE — CRYO/CUFF GRAVITY COOLER KNEE LARGE

## (undated) DEVICE — DRSG DERMABOND PRINEO 60CM

## (undated) DEVICE — TOURNIQUET CUFF 34" DUAL PORT W PLC

## (undated) DEVICE — ORTHOALIGN PLUS UNIT

## (undated) DEVICE — VENODYNE/SCD SLEEVE CALF MEDIUM

## (undated) DEVICE — HANDPIECE INTERPULSE W MULTI TIP

## (undated) DEVICE — SOL IRR POUR NS 0.9% 500ML

## (undated) DEVICE — DRSG ACE BANDAGE 6"

## (undated) DEVICE — HOOD FLYTE STRYKER HELMET SHIELD

## (undated) DEVICE — ELCTR ROCKER SWITCH PENCIL BLUE 10FT

## (undated) DEVICE — SOLIDIFIER CANN EXPRESS 3K

## (undated) DEVICE — SUT STRATAFIX SPIRAL MONOCRYL PLUS 3-0 30CM PS-2 UNDYED

## (undated) DEVICE — PACK TOTAL KNEE

## (undated) DEVICE — ELCTR AQUAMANTYS BIPOLAR SEALER 6.0

## (undated) DEVICE — SAW BLADE STRYKER SAGITTAL 25X98.5X1.24MM

## (undated) DEVICE — SYR LUER LOK 50CC

## (undated) DEVICE — SOL IRR POUR H2O 1500ML

## (undated) DEVICE — NDL SPINAL 18G X 3.5" (PINK)

## (undated) DEVICE — SUT VICRYL PLUS 1 27" CP UNDYED

## (undated) DEVICE — DRSG WEBRIL 6"

## (undated) DEVICE — SPECIMEN CONTAINER PET

## (undated) DEVICE — SOL IRR BAG NS 0.9% 3000ML